# Patient Record
Sex: MALE | Race: WHITE | NOT HISPANIC OR LATINO | Employment: FULL TIME | URBAN - METROPOLITAN AREA
[De-identification: names, ages, dates, MRNs, and addresses within clinical notes are randomized per-mention and may not be internally consistent; named-entity substitution may affect disease eponyms.]

---

## 2017-01-17 ENCOUNTER — TELEPHONE (OUTPATIENT)
Dept: NEUROSURGERY | Facility: CLINIC | Age: 62
End: 2017-01-17

## 2017-01-17 ENCOUNTER — OFFICE VISIT (OUTPATIENT)
Dept: NEUROSURGERY | Facility: CLINIC | Age: 62
End: 2017-01-17

## 2017-01-17 VITALS
RESPIRATION RATE: 16 BRPM | DIASTOLIC BLOOD PRESSURE: 82 MMHG | WEIGHT: 303 LBS | HEART RATE: 100 BPM | SYSTOLIC BLOOD PRESSURE: 142 MMHG | BODY MASS INDEX: 36.88 KG/M2

## 2017-01-17 DIAGNOSIS — R20.0 LEFT ARM NUMBNESS: ICD-10-CM

## 2017-01-17 DIAGNOSIS — M47.812 CERVICAL SPONDYLOSIS WITHOUT MYELOPATHY: ICD-10-CM

## 2017-01-17 DIAGNOSIS — M54.12 CERVICAL RADICULOPATHY: Primary | ICD-10-CM

## 2017-01-17 DIAGNOSIS — M54.12 CERVICAL RADICULAR PAIN: ICD-10-CM

## 2017-01-17 PROCEDURE — 99214 OFFICE O/P EST MOD 30 MIN: CPT | Performed by: NEUROLOGICAL SURGERY

## 2017-01-17 RX ORDER — GABAPENTIN 100 MG/1
CAPSULE ORAL
Qty: 90 CAPSULE | Refills: 0 | Status: SHIPPED | OUTPATIENT
Start: 2017-01-17 | End: 2017-03-01 | Stop reason: SDUPTHER

## 2017-01-17 RX ORDER — BUPIVACAINE HCL/0.9 % NACL/PF 0.1 %
3 PLASTIC BAG, INJECTION (ML) EPIDURAL ONCE
Status: CANCELLED | OUTPATIENT
Start: 2017-01-17 | End: 2017-01-17

## 2017-01-17 RX ORDER — MELOXICAM 7.5 MG/1
TABLET ORAL
Qty: 60 TABLET | Refills: 1 | Status: SHIPPED | OUTPATIENT
Start: 2017-01-17 | End: 2017-03-01 | Stop reason: SDUPTHER

## 2017-01-17 NOTE — TELEPHONE ENCOUNTER
----- Message from Abril Cain sent at 1/17/2017 12:59 PM EST -----  Pt needs refill on meloxicam (needs 60 pills not 30 please as he takes two tablets once daily) and neurontin please. He forgot to ask for refills when he was here. Thanks.

## 2017-01-17 NOTE — MR AVS SNAPSHOT
Amador Mendoza   1/17/2017 11:30 AM   Office Visit    Dept Phone:  [phone]   Encounter #:  [id number]    Provider:  Lion Wood MD   Department:  [facility]                Your Full Care Plan              Your Updated Medication List          This list is accurate as of: 1/17/17  1:07 PM.  Always use your most recent med list.                ACETAMINOPHEN EXTRA STRENGTH PO       cetirizine 10 MG tablet   Commonly known as:  zyrTEC       FLONASE 50 MCG/ACT nasal spray   Generic drug:  fluticasone       gabapentin 100 MG capsule   Commonly known as:  NEURONTIN   Take 1 capsule by mouth 3 (three) times a day.       meloxicam 7.5 MG tablet   Commonly known as:  MOBIC   TAKE TWO TABLETS BY MOUTH DAILY               We Performed the Following     Case Request     Clorhexidine skin prep     Obtain informed consent       You Were Diagnosed With        Codes Comments    Cervical radiculopathy    -  Primary ICD-10-CM: M54.12  ICD-9-CM: 723.4     Cervical radicular pain     ICD-10-CM: M54.12  ICD-9-CM: 723.4     Left arm numbness     ICD-10-CM: R20.0  ICD-9-CM: 782.0     Cervical spondylosis without myelopathy     ICD-10-CM: M47.812  ICD-9-CM: 721.0       Instructions     None    Patient Instructions History      Upcoming Appointments     Visit Type Date Time Department    OFFICE VISIT 1/17/2017 11:30 AM [facility]    PAT 3/1/2017  7:30 AM  [facility]    OFFICE VISIT 3/1/2017  8:45 AM [facility]    POST-OP 3/15/2017  8:45 AM [facility]      [facility] Signup     Our records indicate that you have an active [employer] account.    You can view your After Visit Summary by going to [employer] and logging in with your [employer] username and password.  If you don't have a [employer] username and password but a parent or guardian has access to your record, the parent or guardian should login with their own [employer]  username and password and access your record to view the After Visit Summary.    If you have questions, you can email [email or web address] or call [phone] to talk to our [employer] staff.  Remember, [employer] is NOT to be used for urgent needs.  For medical emergencies, dial 911.               Other Info from Your Visit           Your Appointments     Mar 01, 2017  7:30 AM EST   Pre-Admission Testing with  [facility] BECKER 4   [facility] ([place])    [address] [facility] [address]   [phone]            Mar 01, 2017  8:45 AM EST   Office Visit with NICOLE Bob   On license of [facility] (--)    [address] [address] [phone]           Arrive 15 minutes prior to appointment.            Mar 15, 2017  8:45 AM EDT   Post-Op with NICOLE Bob   On license of [facility] (--)    [address] [place] [address] [phone]              Allergies     No Known Allergies      Reason for Visit     Numbness left arm    Extremity Weakness left  weakness      Vital Signs     Blood Pressure Pulse Respirations Weight Body Mass Index Smoking Status    142/82 (BP Location: Right arm, Patient Position: Sitting) 100 16 303 lb (137 kg) 36.88 kg/m2 Former Smoker      Problems and Diagnoses Noted     Cervical radicular pain    Degenerative arthritis of cervical spine    Left arm numbness    Cervical nerve root disorder    -  Primary

## 2017-01-17 NOTE — PROGRESS NOTES
Subjective   Patient ID: Amador Mendoza is a 62 y.o. male is here today for follow-up after cervical myelogram/CT for left arm numbness. He is unaccompanied.    History of Present Illness     The patient returns to the office today following cervical myelography and CT scanning.  His primary complaint is really neck discomfort that radiates into the left side of the shoulder in the supraspinatus area.  Continues with some numbness in the left lower extremity.  He continues with numbness in the left deltoid and biceps as well as the entirety of the left hand.    The following portions of the patient's history were reviewed and updated as appropriate: allergies, current medications, past family history, past medical history, past social history, past surgical history and problem list.    Review of Systems   Musculoskeletal: Negative for gait problem, neck pain and neck stiffness.   Neurological: Positive for weakness and numbness.       Objective   Physical Exam   Constitutional: He is oriented to person, place, and time.   Eyes: EOM are normal. Pupils are equal, round, and reactive to light.   Neurological: He is oriented to person, place, and time. He has a normal Finger-Nose-Finger Test, a normal Heel to Shin Test, a normal Romberg Test and a normal Tandem Gait Test. Gait normal.   Reflex Scores:       Tricep reflexes are 2+ on the right side and 2+ on the left side.       Bicep reflexes are 2+ on the right side and 2+ on the left side.       Brachioradialis reflexes are 2+ on the right side and 2+ on the left side.       Patellar reflexes are 2+ on the right side and 2+ on the left side.       Achilles reflexes are 2+ on the right side and 2+ on the left side.  Psychiatric: His speech is normal.     Neurologic Exam     Mental Status   Oriented to person, place, and time.   Registration: recalls 3 of 3 objects. Recall at 5 minutes: recalls 3 of 3 objects. Follows 3 step commands.   Attention: normal.  Concentration: normal.   Speech: speech is normal   Level of consciousness: alert  Knowledge: good.   Able to name object. Able to read. Able to repeat. Able to write. Normal comprehension.     Cranial Nerves     CN II   Visual fields full to confrontation.     CN III, IV, VI   Pupils are equal, round, and reactive to light.  Extraocular motions are normal.   Right pupil: Consensual response: intact.   Left pupil: Consensual response: intact.   CN III: no CN III palsy  CN VI: no CN VI palsy  Nystagmus: none   Diplopia: none  Ophthalmoparesis: none  Upgaze: normal  Downgaze: normal  Conjugate gaze: present  Vestibulo-ocular reflex: present    CN V   Facial sensation intact.     CN VII   Facial expression full, symmetric.     CN VIII   CN VIII normal.     CN IX, X   CN IX normal.     CN XI   CN XI normal.     CN XII   CN XII normal.     Motor Exam   Muscle bulk: normal  Overall muscle tone: normal  Right arm tone: normal  Left arm tone: normal  Right arm pronator drift: absent  Left arm pronator drift: absent  Right leg tone: normal  Left leg tone: normal    Strength   Right neck flexion: 5/5  Left neck flexion: 5/5  Right neck extension: 5/5  Left neck extension: 5/5  Right deltoid: 5/5  Left deltoid: 5/5  Right biceps: 5/5  Left biceps: 5/5  Right triceps: 5/5  Left triceps: 5/5  Right wrist flexion: 5/5  Left wrist flexion: 5/5  Right wrist extension: 5/5  Left wrist extension: 5/5  Right interossei: 5/5  Left interossei: 5/5  Right abdominals: 5/5  Left abdominals: 5/5  Right iliopsoas: 5/5  Left iliopsoas: 5/5  Right quadriceps: 5/5  Left quadriceps: 5/5  Right hamstrin/5  Left hamstrin/5  Right glutei: 5/5  Left glutei: 5/5  Right anterior tibial: 5/5  Left anterior tibial: 5/5  Right posterior tibial: 5/5  Left posterior tibial: 5/5  Right peroneal: 5/5  Left peroneal: 5/5  Right gastroc: 5/5  Left gastroc: 5/5    Sensory Exam   Light touch normal.   Vibration normal.   Proprioception normal.   Pinprick  normal.     Gait, Coordination, and Reflexes     Gait  Gait: normal    Coordination   Romberg: negative  Finger to nose coordination: normal  Heel to shin coordination: normal  Tandem walking coordination: normal    Tremor   Resting tremor: absent  Intention tremor: absent  Action tremor: absent    Reflexes   Right brachioradialis: 2+  Left brachioradialis: 2+  Right biceps: 2+  Left biceps: 2+  Right triceps: 2+  Left triceps: 2+  Right patellar: 2+  Left patellar: 2+  Right achilles: 2+  Left achilles: 2+  Right : 2+  Left : 2+  Right plantar: normal  Left plantar: normal  Right Acuna: absent  Left Acuna: absent  Right ankle clonus: absent  Left ankle clonus: absent      Assessment/Plan   Independent Review of Radiographic Studies:    I personally reviewed the MRI of the cervical spine as well as the CT/myelogram of the cervical spine.  These demonstrate severe neural foraminal narrowing on the left at C4 5.  There are other areas of mild degenerative arthritic change without significant neural foraminal narrowing except there is moderate degenerative neural foraminal narrowing on the left and on the right at C5 6.     NCV/EMG of the upper extremities demonstrated a moderate ulnar neuropathy on the left.  The lower extremity EMG and nerve conduction testing was essentially normal.      Medical Decision Making:    Presents with the above-noted history and physical findings.  There are no red flags.     Given options include continued conservative treatment or consideration of surgical intervention to alleviate the compression of the exiting C5 and C6 nerve roots.  He has numbness into the C5 and C6 distribution.  Consideration also, if he fails to improve with this surgical intervention of ulnar nerve decompression would be appropriate.  The fact is that he has numbness into the entirety of the hand which would not be explained by ulnar nerve entrapment alone.    The risks, benefits, and alternatives of  anterior cervical discectomy with allograft fusion and anterior instrumentation were explained in detail to the patient. The alternative is not to have the operation. The benefit should be, though is not guaranteed, primarily a potential reduction in the neurosensory and/or motor dysfunction; secondarily a possible reduction in neck pain. The risks include, but are not limited to, the possibility of death, infection, bleeding, paralysis, problems with the approach to the cervical spine such as stretching or cutting the laryngeal nerve resulting in vocal chord paralysis, hoarseness, blindness; damage to the carotid artery resulting in stroke; damage to the esophagus resulting in problems with swallowing or painful swallowing; incontinence of urine or stool, sexual dysfunction; meningitis; lack of bony fusion requiring further surgical intervention; osteomyelitis; instrumentation breakdown or pullout; spinal instability; no change or worsening of pain. I also explained the realistic expectations as they pertain to the procedure. The patient voiced understanding of these risks, benefits, alternatives, and realistic expectations and requests that we proceed with operative intervention.    After a complete physical exam, the patient has been informed of the consequences, benefits, appropriate us, and office policies regarding the medication being prescribed. A RHONDA check will be made on-line, and will be repeated if prescription is renewed after a 90 day period. The patient agrees to adhering to the medication regimen as prescribed.    The patient has been advised that we will manage post-operative pain for 1 month. If further narcotic medication is needed beyond that period, a referral back to the primary care physician or to a pain management specialist will be made. If the patient cancels or fails to show for scheduled follow-up visits or the pain management referral,  further narcotic prescriptions from this  practice may cease.      PLAN: C4 5 and C5 6 anterior cervical discectomy.    August was seen today for numbness and extremity weakness.    Diagnoses and all orders for this visit:    Cervical radiculopathy  -     Case Request; Standing  -     CBC and Differential; Future  -     Basic metabolic panel; Future  -     Sedimentation rate; Future  -     ceFAZolin (ANCEF) 3 g in sodium chloride 0.9 % 100 mL IVPB; Infuse 3 g into a venous catheter 1 (One) Time.  -     Case Request    Cervical radicular pain    Left arm numbness    Cervical spondylosis without myelopathy    Other orders  -     Obtain informed consent  -     ARCELIA hose- To be placed on patient in pre-op; Standing  -     SCD (sequential compression device)- to be placed on patient in Pre-op; Standing  -     POC Glucose Fingerstick; Standing  -     Inpatient Admission; Standing  -     Follow Anesthesia Guidelines / Standing Orders; Future  -     Clorhexidine skin prep  -     Follow Anesthesia Guidelines / Standing Orders; Standing  -     Verify informed consent; Standing  -     Verify NPO Status; Standing    Return for Recheck 2 weeks after surgery, Follow up with nurse practitioner.

## 2017-01-17 NOTE — TELEPHONE ENCOUNTER
I LVM with patient. As noted to him in the past, he should ask his pharmacy to submit the refill request for any meds other than pain medications. I will let Minnie/Sully know to change the quantity for the meloxicam. According to chart, there should still be a refill left for the neurontin.

## 2017-01-17 NOTE — LETTER
January 17, 2017     Javier Trujillo MD  [address] [facility] [address]  [place] [id number]    Patient: Amador Mendoza   YOB: 1955   Date of Visit: 1/17/2017       Dear Dr. Cassandra MD:    Amador Mendoza was in my office today. Below is a copy of my note.    If you have questions, please do not hesitate to call me. I look forward to following August along with you.         Sincerely,        Lion Wood MD        CC: Mk Rodríguez MD    Subjective   Patient ID: Amador Mendoza is a 62 y.o. male is here today for follow-up after cervical myelogram/CT for left arm numbness. He is unaccompanied.    History of Present Illness     The patient returns to the office today following cervical myelography and CT scanning.  His primary complaint is really neck discomfort that radiates into the left side of the shoulder in the supraspinatus area.  Continues with some numbness in the left lower extremity.  He continues with numbness in the left deltoid and biceps as well as the entirety of the left hand.    The following portions of the patient's history were reviewed and updated as appropriate: allergies, current medications, past family history, past medical history, past social history, past surgical history and problem list.    Review of Systems   Musculoskeletal: Negative for gait problem, neck pain and neck stiffness.   Neurological: Positive for weakness and numbness.       Objective   Physical Exam   Constitutional: He is oriented to person, place, and time.   Eyes: EOM are normal. Pupils are equal, round, and reactive to light.   Neurological: He is oriented to person, place, and time. He has a normal Finger-Nose-Finger Test, a normal Heel to Shin Test, a normal Romberg Test and a normal Tandem Gait Test. Gait normal.   Reflex Scores:       Tricep reflexes are 2+ on the right side and 2+ on the left side.       Bicep reflexes are 2+ on the right side and 2+ on the left side.   Brachioradialis reflexes are 2+ on the right side and 2+ on the left side.       Patellar reflexes are 2+ on the right side and 2+ on the left side.       Achilles reflexes are 2+ on the right side and 2+ on the left side.  Psychiatric: His speech is normal.     Neurologic Exam     Mental Status   Oriented to person, place, and time.   Registration: recalls 3 of 3 objects. Recall at 5 minutes: recalls 3 of 3 objects. Follows 3 step commands.   Attention: normal. Concentration: normal.   Speech: speech is normal   Level of consciousness: alert  Knowledge: good.   Able to name object. Able to read. Able to repeat. Able to write. Normal comprehension.     Cranial Nerves     CN II   Visual fields full to confrontation.     CN III, IV, VI   Pupils are equal, round, and reactive to light.  Extraocular motions are normal.   Right pupil: Consensual response: intact.   Left pupil: Consensual response: intact.   CN III: no CN III palsy  CN VI: no CN VI palsy  Nystagmus: none   Diplopia: none  Ophthalmoparesis: none  Upgaze: normal  Downgaze: normal  Conjugate gaze: present  Vestibulo-ocular reflex: present    CN V   Facial sensation intact.     CN VII   Facial expression full, symmetric.     CN VIII   CN VIII normal.     CN IX, X   CN IX normal.     CN XI   CN XI normal.     CN XII   CN XII normal.     Motor Exam   Muscle bulk: normal  Overall muscle tone: normal  Right arm tone: normal  Left arm tone: normal  Right arm pronator drift: absent  Left arm pronator drift: absent  Right leg tone: normal  Left leg tone: normal    Strength   Right neck flexion: 5/5  Left neck flexion: 5/5  Right neck extension: 5/5  Left neck extension: 5/5  Right deltoid: 5/5  Left deltoid: 5/5  Right biceps: 5/5  Left biceps: 5/5  Right triceps: 5/5  Left triceps: 5/5  Right wrist flexion: 5/5  Left wrist flexion: 5/5  Right wrist extension: 5/5  Left wrist extension: 5/5  Right interossei: 5/5  Left interossei: 5/5  Right abdominals: 5/5  Left  abdominals: 5/5  Right iliopsoas: 5/5  Left iliopsoas: 5/5  Right quadriceps: 5/5  Left quadriceps: 5/5  Right hamstrin/5  Left hamstrin/5  Right glutei: 5/5  Left glutei: 5/5  Right anterior tibial: 5/5  Left anterior tibial: 5/5  Right posterior tibial: 5/5  Left posterior tibial: 5/5  Right peroneal: 5/5  Left peroneal: 5/5  Right gastroc: 5/5  Left gastroc: 5/5    Sensory Exam   Light touch normal.   Vibration normal.   Proprioception normal.   Pinprick normal.     Gait, Coordination, and Reflexes     Gait  Gait: normal    Coordination   Romberg: negative  Finger to nose coordination: normal  Heel to shin coordination: normal  Tandem walking coordination: normal    Tremor   Resting tremor: absent  Intention tremor: absent  Action tremor: absent    Reflexes   Right brachioradialis: 2+  Left brachioradialis: 2+  Right biceps: 2+  Left biceps: 2+  Right triceps: 2+  Left triceps: 2+  Right patellar: 2+  Left patellar: 2+  Right achilles: 2+  Left achilles: 2+  Right : 2+  Left : 2+  Right plantar: normal  Left plantar: normal  Right Acuna: absent  Left Acuna: absent  Right ankle clonus: absent  Left ankle clonus: absent      Assessment/Plan   Independent Review of Radiographic Studies:    I personally reviewed the MRI of the cervical spine as well as the CT/myelogram of the cervical spine.  These demonstrate severe neural foraminal narrowing on the left at C4 5.  There are other areas of mild degenerative arthritic change without significant neural foraminal narrowing except there is moderate degenerative neural foraminal narrowing on the left and on the right at C5 6.     NCV/EMG of the upper extremities demonstrated a moderate ulnar neuropathy on the left.  The lower extremity EMG and nerve conduction testing was essentially normal.      Medical Decision Making:    Presents with the above-noted history and physical findings.  There are no red flags.     Given options include continued  conservative treatment or consideration of surgical intervention to alleviate the compression of the exiting C5 and C6 nerve roots.  He has numbness into the C5 and C6 distribution.  Consideration also, if he fails to improve with this surgical intervention of ulnar nerve decompression would be appropriate.  The fact is that he has numbness into the entirety of the hand which would not be explained by ulnar nerve entrapment alone.    The risks, benefits, and alternatives of anterior cervical discectomy with allograft fusion and anterior instrumentation were explained in detail to the patient. The alternative is not to have the operation. The benefit should be, though is not guaranteed, primarily a potential reduction in the neurosensory and/or motor dysfunction; secondarily a possible reduction in neck pain. The risks include, but are not limited to, the possibility of death, infection, bleeding, paralysis, problems with the approach to the cervical spine such as stretching or cutting the laryngeal nerve resulting in vocal chord paralysis, hoarseness, blindness; damage to the carotid artery resulting in stroke; damage to the esophagus resulting in problems with swallowing or painful swallowing; incontinence of urine or stool, sexual dysfunction; meningitis; lack of bony fusion requiring further surgical intervention; osteomyelitis; instrumentation breakdown or pullout; spinal instability; no change or worsening of pain. I also explained the realistic expectations as they pertain to the procedure. The patient voiced understanding of these risks, benefits, alternatives, and realistic expectations and requests that we proceed with operative intervention.    After a complete physical exam, the patient has been informed of the consequences, benefits, appropriate us, and office policies regarding the medication being prescribed. A RHONDA check will be made on-line, and will be repeated if prescription is renewed after a  90 day period. The patient agrees to adhering to the medication regimen as prescribed.    The patient has been advised that we will manage post-operative pain for 1 month. If further narcotic medication is needed beyond that period, a referral back to the primary care physician or to a pain management specialist will be made. If the patient cancels or fails to show for scheduled follow-up visits or the pain management referral,  further narcotic prescriptions from this practice may cease.      PLAN: C4 5 and C5 6 anterior cervical discectomy.    August was seen today for numbness and extremity weakness.    Diagnoses and all orders for this visit:    Cervical radiculopathy  -     Case Request; Standing  -     CBC and Differential; Future  -     Basic metabolic panel; Future  -     Sedimentation rate; Future  -     ceFAZolin (ANCEF) 3 g in sodium chloride 0.9 % 100 mL IVPB; Infuse 3 g into a venous catheter 1 (One) Time.  -     Case Request    Cervical radicular pain    Left arm numbness    Cervical spondylosis without myelopathy    Other orders  -     Obtain informed consent  -     ARCELIA hose- To be placed on patient in pre-op; Standing  -     SCD (sequential compression device)- to be placed on patient in Pre-op; Standing  -     POC Glucose Fingerstick; Standing  -     Inpatient Admission; Standing  -     Follow Anesthesia Guidelines / Standing Orders; Future  -     Clorhexidine skin prep  -     Follow Anesthesia Guidelines / Standing Orders; Standing  -     Verify informed consent; Standing  -     Verify NPO Status; Standing    Return for Recheck 2 weeks after surgery, Follow up with nurse practitioner.

## 2017-01-22 ENCOUNTER — RESULTS ENCOUNTER (OUTPATIENT)
Dept: NEUROSURGERY | Facility: CLINIC | Age: 62
End: 2017-01-22

## 2017-01-22 DIAGNOSIS — M54.12 CERVICAL RADICULOPATHY: ICD-10-CM

## 2017-02-27 ENCOUNTER — DOCUMENTATION (OUTPATIENT)
Dept: PHYSICAL THERAPY | Facility: CLINIC | Age: 62
End: 2017-02-27

## 2017-02-27 NOTE — PROGRESS NOTES
Discharge Summary  Discharge Summary from Physical Therapy Report    Patient: Amador Mendoza   : 1955  Diagnosis/ICD-10 Code:  Cervical Spine  Referring practitioner: Javier Trujillo MD  Date of Initial Visit: 16  Date of Last Visit: 16     Number of Visits: 9     Discharge Status of Patient: Unknown at this time.    Goals: Partially Met    Discharge Plan: Discharge for noncompliance with attendance.    Comments:  Pt called sometime after his last visit on 16 and reports that he would wait on further PT until after the results of his MRI.  The pt has not rescheduled at this time and will be discharged.    Date of Discharge: 2017        Raymond Rodriguez PT  Physical Therapist

## 2017-03-01 ENCOUNTER — APPOINTMENT (OUTPATIENT)
Dept: PREADMISSION TESTING | Facility: HOSPITAL | Age: 62
End: 2017-03-01

## 2017-03-01 ENCOUNTER — OFFICE VISIT (OUTPATIENT)
Dept: NEUROSURGERY | Facility: CLINIC | Age: 62
End: 2017-03-01

## 2017-03-01 VITALS
RESPIRATION RATE: 20 BRPM | BODY MASS INDEX: 38.09 KG/M2 | DIASTOLIC BLOOD PRESSURE: 91 MMHG | SYSTOLIC BLOOD PRESSURE: 157 MMHG | OXYGEN SATURATION: 94 % | TEMPERATURE: 97.8 F | WEIGHT: 306.3 LBS | HEART RATE: 79 BPM | HEIGHT: 75 IN

## 2017-03-01 VITALS
WEIGHT: 304 LBS | RESPIRATION RATE: 16 BRPM | DIASTOLIC BLOOD PRESSURE: 100 MMHG | SYSTOLIC BLOOD PRESSURE: 170 MMHG | HEART RATE: 84 BPM | BODY MASS INDEX: 38 KG/M2

## 2017-03-01 DIAGNOSIS — M54.12 CERVICAL RADICULAR PAIN: ICD-10-CM

## 2017-03-01 DIAGNOSIS — M47.812 CERVICAL SPONDYLOSIS WITHOUT MYELOPATHY: Primary | ICD-10-CM

## 2017-03-01 LAB
ANION GAP SERPL CALCULATED.3IONS-SCNC: 13.7 MMOL/L
BASOPHILS # BLD AUTO: 0.03 10*3/MM3 (ref 0–0.2)
BASOPHILS NFR BLD AUTO: 0.5 % (ref 0–1.5)
BUN BLD-MCNC: 14 MG/DL (ref 8–23)
BUN/CREAT SERPL: 14.7 (ref 7–25)
CALCIUM SPEC-SCNC: 9.4 MG/DL (ref 8.6–10.5)
CHLORIDE SERPL-SCNC: 101 MMOL/L (ref 98–107)
CO2 SERPL-SCNC: 23.3 MMOL/L (ref 22–29)
CREAT BLD-MCNC: 0.95 MG/DL (ref 0.76–1.27)
DEPRECATED RDW RBC AUTO: 44.3 FL (ref 37–54)
EOSINOPHIL # BLD AUTO: 0.13 10*3/MM3 (ref 0–0.7)
EOSINOPHIL NFR BLD AUTO: 2 % (ref 0.3–6.2)
ERYTHROCYTE [DISTWIDTH] IN BLOOD BY AUTOMATED COUNT: 12.8 % (ref 11.5–14.5)
ERYTHROCYTE [SEDIMENTATION RATE] IN BLOOD: 14 MM/HR (ref 0–20)
GFR SERPL CREATININE-BSD FRML MDRD: 81 ML/MIN/1.73
GLUCOSE BLD-MCNC: 138 MG/DL (ref 65–99)
HCT VFR BLD AUTO: 42.5 % (ref 40.4–52.2)
HGB BLD-MCNC: 14.3 G/DL (ref 13.7–17.6)
IMM GRANULOCYTES # BLD: 0.05 10*3/MM3 (ref 0–0.03)
IMM GRANULOCYTES NFR BLD: 0.8 % (ref 0–0.5)
LYMPHOCYTES # BLD AUTO: 2.38 10*3/MM3 (ref 0.9–4.8)
LYMPHOCYTES NFR BLD AUTO: 36.3 % (ref 19.6–45.3)
MCH RBC QN AUTO: 32 PG (ref 27–32.7)
MCHC RBC AUTO-ENTMCNC: 33.6 G/DL (ref 32.6–36.4)
MCV RBC AUTO: 95.1 FL (ref 79.8–96.2)
MONOCYTES # BLD AUTO: 0.48 10*3/MM3 (ref 0.2–1.2)
MONOCYTES NFR BLD AUTO: 7.3 % (ref 5–12)
NEUTROPHILS # BLD AUTO: 3.48 10*3/MM3 (ref 1.9–8.1)
NEUTROPHILS NFR BLD AUTO: 53.1 % (ref 42.7–76)
PLATELET # BLD AUTO: 234 10*3/MM3 (ref 140–500)
PMV BLD AUTO: 10.3 FL (ref 6–12)
POTASSIUM BLD-SCNC: 4.1 MMOL/L (ref 3.5–5.2)
RBC # BLD AUTO: 4.47 10*6/MM3 (ref 4.6–6)
SODIUM BLD-SCNC: 138 MMOL/L (ref 136–145)
WBC NRBC COR # BLD: 6.55 10*3/MM3 (ref 4.5–10.7)

## 2017-03-01 PROCEDURE — 80048 BASIC METABOLIC PNL TOTAL CA: CPT | Performed by: NEUROLOGICAL SURGERY

## 2017-03-01 PROCEDURE — 93005 ELECTROCARDIOGRAM TRACING: CPT

## 2017-03-01 PROCEDURE — 85652 RBC SED RATE AUTOMATED: CPT | Performed by: NEUROLOGICAL SURGERY

## 2017-03-01 PROCEDURE — 36415 COLL VENOUS BLD VENIPUNCTURE: CPT | Performed by: NEUROLOGICAL SURGERY

## 2017-03-01 PROCEDURE — 99214 OFFICE O/P EST MOD 30 MIN: CPT | Performed by: NURSE PRACTITIONER

## 2017-03-01 PROCEDURE — 85025 COMPLETE CBC W/AUTO DIFF WBC: CPT | Performed by: NEUROLOGICAL SURGERY

## 2017-03-01 PROCEDURE — 93010 ELECTROCARDIOGRAM REPORT: CPT | Performed by: INTERNAL MEDICINE

## 2017-03-01 RX ORDER — MELOXICAM 7.5 MG/1
7.5 TABLET ORAL
COMMUNITY
End: 2017-03-14 | Stop reason: HOSPADM

## 2017-03-01 RX ORDER — GABAPENTIN 100 MG/1
100 CAPSULE ORAL 3 TIMES DAILY
COMMUNITY
End: 2017-06-01

## 2017-03-01 NOTE — DISCHARGE INSTRUCTIONS
Take the following medications the morning of surgery with a small sip of water:  GABAPENTIN    ARRIVAL TIME 0700 TO MAIN OR        General Instructions:  • Do not eat or drink after midnight: includes water, mints, or gum. You may brush your teeth.  • Do not smoke, chew tobacco, or drink alcohol.  • The Pre-Admission Testing nurse will instruct you to bring medications if unable to obtain an accurate list in Pre-Admission Testing.    • If applicable bring your C-PAP/ BI-PAP machine.  • Bring any papers given to you in the doctor’s office.  • Wear clean comfortable clothes and socks.  • Do not wear contact lenses or make-up.  Bring a case for your glasses if applicable.   • Bring crutches or walker if applicable.  • Leave all other valuables and jewelry at home.        Preventing a Surgical Site Infection:  Shower on the morning of surgery using a fresh bar of anti-bacterial soap (such as Dial) and clean washcloth.  Dry with a clean towel and dress in clean clothing.  For 2 to 3 days before surgery, avoid shaving with a razor near where you will have surgery because the razor can irritate skin and make it easier to develop an infection  Ask your surgeon if you will be receiving antibiotics prior to surgery  Make sure you, your family, and all healthcare providers clean their hands with soap and water or an alcohol based hand  before caring for you or your wound  If at all possible, quit smoking as many days before surgery as you can.    Day of surgery:  Upon arrival, a Pre-op nurse and Anesthesiologist will review your health history, obtain vital signs, and answer questions you may have.  The only belongings needed at this time will be your home medications and if applicable your C-PAP/BI-PAP machine.  If you are staying overnight your family can leave the rest of your belongings in the car and bring them to your room later.  A Pre-op nurse will start an IV and you may receive medication in preparation for  surgery, including something to help you relax.  Your family will be able to see you in the Pre-op area.  While you are in surgery your family should notify the waiting room  if they leave the waiting room area and provide a contact phone number.    Please be aware that surgery does come with discomfort.  We want to make every effort to control your discomfort so please discuss any uncontrolled symptoms with your nurse.   Your doctor will most likely have prescribed pain medications.      If you are going home after surgery you will receive individualized written care instructions before being discharged.  A responsible adult must drive you to and from the hospital on the day of your surgery and stay with you for 24 hours.    If you are staying overnight following surgery, you will be transported to your hospital room following the recovery period.  [facility] has all private rooms.    If you have any questions please call Pre-Admission Testing at [phone].  Deductibles and co-payments are collected on the day of service. Please be prepared to pay the required co-pay, deductible or deposit on the day of service as defined by your plan.

## 2017-03-01 NOTE — PROGRESS NOTES
Subjective   Patient ID: Amador Mendoza is a 62 y.o. male is here today for follow-up of neck pain and left arm numbness prior to scheduled ACDF on 3-6-17. He is unaccompanied.    History of Present Illness   Patient presents for followup prior to undergoing 2 level ACDF. He reports continued neck pain with right arm numbness. His right shoulder has become more painful. He denies any new weakness, dyscoordination, or imbalance.     The following portions of the patient's history were reviewed and updated as appropriate: allergies, current medications, past family history, past medical history, past social history, past surgical history and problem list.  .    Review of Systems   Constitutional: Negative for fever.   HENT: Negative for trouble swallowing.    Gastrointestinal: Negative for abdominal pain.   Genitourinary: Negative for difficulty urinating and enuresis.   Musculoskeletal: Positive for neck pain. Negative for gait problem.   Neurological: Positive for weakness (left ) and numbness.       Objective        Results from last 7 days  Lab Units 03/01/17  0735   WBC 10*3/mm3 6.55   HEMOGLOBIN g/dL 14.3   HEMATOCRIT % 42.5   PLATELETS 10*3/mm3 234       Results from last 7 days  Lab Units 03/01/17  0735   SODIUM mmol/L 138   POTASSIUM mmol/L 4.1   CHLORIDE mmol/L 101   TOTAL CO2 mmol/L 23.3   BUN mg/dL 14   CREATININE mg/dL 0.95   CALCIUM mg/dL 9.4   GLUCOSE mg/dL 138*         Results from last 7 days  Lab Units 03/01/17  0735   SED RATE mm/hr 14       EKG: normal SR with left anterior fasicular block- pending MD review per report    Physical Exam   Constitutional: He is oriented to person, place, and time. He appears well-developed and well-nourished. He is cooperative.   obese   Eyes: No scleral icterus.   Neck: Neck supple.   Cardiovascular: Normal rate, regular rhythm and intact distal pulses.    No murmur heard.  Pulmonary/Chest: Effort normal and breath sounds normal.   Abdominal: Soft. Bowel  sounds are normal. There is no tenderness.   Musculoskeletal:        Cervical back: He exhibits decreased range of motion (mildly with rotation) and pain (mildly with rotation).   Neurological: He is alert and oriented to person, place, and time. He has normal strength. He displays no atrophy. No cranial nerve deficit or sensory deficit. He exhibits normal muscle tone. He has a normal Romberg Test. He displays a negative Romberg sign. Gait normal. Coordination and gait normal. GCS eye subscore is 4. GCS verbal subscore is 5. GCS motor subscore is 6.   Reflex Scores:       Tricep reflexes are 2+ on the right side and 2+ on the left side.       Bicep reflexes are 2+ on the right side and 2+ on the left side.       Brachioradialis reflexes are 2+ on the right side and 2+ on the left side.       Patellar reflexes are 2+ on the right side and 2+ on the left side.       Achilles reflexes are 2+ on the right side and 2+ on the left side.  Negative Acuna and clonus  Finger to nose intact   Heel to shin intact  Able to tandem walk  Able to walk on heels and toes   Skin: Skin is warm, dry and intact.   Psychiatric: He has a normal mood and affect. His speech is normal and behavior is normal. Judgment and thought content normal. Cognition and memory are normal.   Vitals reviewed.    Neurologic Exam     Mental Status   Oriented to person, place, and time.   Speech: speech is normal   Level of consciousness: alert    Motor Exam   Muscle bulk: normal  Overall muscle tone: normal    Strength   Strength 5/5 throughout.     Sensory Exam   Right arm light touch: normal  Left arm light touch: normal    Gait, Coordination, and Reflexes     Gait  Gait: normal    Coordination   Romberg: negative    Reflexes   Right brachioradialis: 2+  Left brachioradialis: 2+  Right biceps: 2+  Left biceps: 2+  Right triceps: 2+  Left triceps: 2+  Right patellar: 2+  Left patellar: 2+  Right achilles: 2+  Left achilles: 2+  Right Acuna: absent  Left  Armand: absent      Assessment/Plan   Independent Review of Radiographic Studies:    No new images    Medical Decision Making:    Patient with ongoing neck and right shoulder pain with arm numbness. He completed PAT today. His BP is elevated in our office, but was 150s/80s in PAT. His exam is noted above with no red flags. He knows to stop his meloxicam at least one week preop. RBAs of surgery were discussed in detail with Dr. Wood at previous visit. I answered questions with regard to PO expectations and activity. Patient is ready to proceed. 12 lead EKG is pending MD review, but likely LAF block is not an issue.    PLAN: C4/5 and C5/6 ACDF and RTO following    August was seen today for neck pain and numbness.    Diagnoses and all orders for this visit:    Cervical spondylosis without myelopathy    Cervical radicular pain      Return for Next scheduled follow up.         Addendum: Patient's symptoms are LEFT sided not right sided.

## 2017-03-13 ENCOUNTER — HOSPITAL ENCOUNTER (INPATIENT)
Facility: HOSPITAL | Age: 62
LOS: 1 days | Discharge: HOME | End: 2017-03-14
Attending: NEUROLOGICAL SURGERY | Admitting: NEUROLOGICAL SURGERY

## 2017-03-13 ENCOUNTER — ANESTHESIA EVENT (OUTPATIENT)
Dept: PERIOP | Facility: HOSPITAL | Age: 62
End: 2017-03-13

## 2017-03-13 ENCOUNTER — ANESTHESIA (OUTPATIENT)
Dept: PERIOP | Facility: HOSPITAL | Age: 62
End: 2017-03-13

## 2017-03-13 ENCOUNTER — APPOINTMENT (OUTPATIENT)
Dept: GENERAL RADIOLOGY | Facility: HOSPITAL | Age: 62
End: 2017-03-13

## 2017-03-13 DIAGNOSIS — M54.12 CERVICAL RADICULOPATHY: ICD-10-CM

## 2017-03-13 PROCEDURE — 94799 UNLISTED PULMONARY SVC/PX: CPT

## 2017-03-13 PROCEDURE — 25010000002 FENTANYL CITRATE (PF) 100 MCG/2ML SOLUTION: Performed by: NURSE ANESTHETIST, CERTIFIED REGISTERED

## 2017-03-13 PROCEDURE — 25010000002 VANCOMYCIN PER 500 MG: Performed by: NEUROLOGICAL SURGERY

## 2017-03-13 PROCEDURE — 25010000002 PHENYLEPHRINE PER 1 ML: Performed by: NURSE ANESTHETIST, CERTIFIED REGISTERED

## 2017-03-13 PROCEDURE — 25010000002 HYDROMORPHONE PER 4 MG: Performed by: NURSE ANESTHETIST, CERTIFIED REGISTERED

## 2017-03-13 PROCEDURE — L0120 CERV FLEX N/ADJ FOAM PRE OTS: HCPCS | Performed by: NEUROLOGICAL SURGERY

## 2017-03-13 PROCEDURE — 25010000002 MORPHINE PER 10 MG: Performed by: NURSE PRACTITIONER

## 2017-03-13 PROCEDURE — 25810000003 SODIUM CHLORIDE 0.9 % WITH KCL 20 MEQ 20-0.9 MEQ/L-% SOLUTION: Performed by: NEUROLOGICAL SURGERY

## 2017-03-13 PROCEDURE — 25010000002 PROPOFOL 10 MG/ML EMULSION: Performed by: NURSE ANESTHETIST, CERTIFIED REGISTERED

## 2017-03-13 PROCEDURE — 25010000002 SUCCINYLCHOLINE PER 20 MG: Performed by: NURSE ANESTHETIST, CERTIFIED REGISTERED

## 2017-03-13 PROCEDURE — 0RB30ZZ EXCISION OF CERVICAL VERTEBRAL DISC, OPEN APPROACH: ICD-10-PCS | Performed by: NEUROLOGICAL SURGERY

## 2017-03-13 PROCEDURE — 00QT0ZZ REPAIR SPINAL MENINGES, OPEN APPROACH: ICD-10-PCS | Performed by: NEUROLOGICAL SURGERY

## 2017-03-13 PROCEDURE — 22551 ARTHRD ANT NTRBDY CERVICAL: CPT | Performed by: NEUROLOGICAL SURGERY

## 2017-03-13 PROCEDURE — 76000 FLUOROSCOPY <1 HR PHYS/QHP: CPT

## 2017-03-13 PROCEDURE — 22845 INSERT SPINE FIXATION DEVICE: CPT | Performed by: NEUROLOGICAL SURGERY

## 2017-03-13 PROCEDURE — C1713 ANCHOR/SCREW BN/BN,TIS/BN: HCPCS | Performed by: NEUROLOGICAL SURGERY

## 2017-03-13 PROCEDURE — 22853 INSJ BIOMECHANICAL DEVICE: CPT | Performed by: NEUROLOGICAL SURGERY

## 2017-03-13 PROCEDURE — 22552 ARTHRD ANT NTRBD CERVICAL EA: CPT | Performed by: NEUROLOGICAL SURGERY

## 2017-03-13 PROCEDURE — 72040 X-RAY EXAM NECK SPINE 2-3 VW: CPT

## 2017-03-13 PROCEDURE — 0RG20A0 FUSION OF 2 OR MORE CERVICAL VERTEBRAL JOINTS WITH INTERBODY FUSION DEVICE, ANTERIOR APPROACH, ANTERIOR COLUMN, OPEN APPROACH: ICD-10-PCS | Performed by: NEUROLOGICAL SURGERY

## 2017-03-13 PROCEDURE — 25010000002 MIDAZOLAM PER 1 MG: Performed by: ANESTHESIOLOGY

## 2017-03-13 PROCEDURE — 20936 SP BONE AGRFT LOCAL ADD-ON: CPT | Performed by: NEUROLOGICAL SURGERY

## 2017-03-13 PROCEDURE — 25010000002 ONDANSETRON PER 1 MG: Performed by: NEUROLOGICAL SURGERY

## 2017-03-13 PROCEDURE — 25010000002 DEXAMETHASONE PER 1 MG: Performed by: NURSE ANESTHETIST, CERTIFIED REGISTERED

## 2017-03-13 DEVICE — IMPLANT 6240764 ANATOMIC 16X14X7MM
Type: IMPLANTABLE DEVICE | Site: SPINE CERVICAL | Status: FUNCTIONAL
Brand: VERTE-STACK® SPINAL SYSTEM

## 2017-03-13 DEVICE — IMPLANT 6240864 ANATOMIC 16X14X8MM
Type: IMPLANTABLE DEVICE | Status: FUNCTIONAL
Brand: VERTE-STACK® SPINAL SYSTEM

## 2017-03-13 DEVICE — SCREW 3120515 4.0 X 15 SELF DRILL VAR
Type: IMPLANTABLE DEVICE | Site: SPINE CERVICAL | Status: FUNCTIONAL
Brand: ATLANTIS® ANTERIOR CERVICAL PLATE SYSTEM

## 2017-03-13 DEVICE — SEALANT WND FIBRIN TISSEEL VAPOR/HEAT/PREFIL/SYR 10ML: Type: IMPLANTABLE DEVICE | Status: FUNCTIONAL

## 2017-03-13 RX ORDER — PROMETHAZINE HYDROCHLORIDE 25 MG/ML
12.5 INJECTION, SOLUTION INTRAMUSCULAR; INTRAVENOUS ONCE AS NEEDED
Status: DISCONTINUED | OUTPATIENT
Start: 2017-03-13 | End: 2017-03-13 | Stop reason: HOSPADM

## 2017-03-13 RX ORDER — NALOXONE HCL 0.4 MG/ML
0.4 VIAL (ML) INJECTION
Status: DISCONTINUED | OUTPATIENT
Start: 2017-03-13 | End: 2017-03-13

## 2017-03-13 RX ORDER — HYDROCODONE BITARTRATE AND ACETAMINOPHEN 5; 325 MG/1; MG/1
2 TABLET ORAL EVERY 6 HOURS PRN
Status: DISCONTINUED | OUTPATIENT
Start: 2017-03-13 | End: 2017-03-14 | Stop reason: HOSPADM

## 2017-03-13 RX ORDER — MIDAZOLAM HYDROCHLORIDE 1 MG/ML
2 INJECTION INTRAMUSCULAR; INTRAVENOUS
Status: DISCONTINUED | OUTPATIENT
Start: 2017-03-13 | End: 2017-03-13 | Stop reason: HOSPADM

## 2017-03-13 RX ORDER — FLUMAZENIL 0.1 MG/ML
0.2 INJECTION INTRAVENOUS AS NEEDED
Status: DISCONTINUED | OUTPATIENT
Start: 2017-03-13 | End: 2017-03-13 | Stop reason: HOSPADM

## 2017-03-13 RX ORDER — DEXAMETHASONE SODIUM PHOSPHATE 10 MG/ML
INJECTION INTRAMUSCULAR; INTRAVENOUS AS NEEDED
Status: DISCONTINUED | OUTPATIENT
Start: 2017-03-13 | End: 2017-03-13 | Stop reason: SURG

## 2017-03-13 RX ORDER — LABETALOL HYDROCHLORIDE 5 MG/ML
5 INJECTION, SOLUTION INTRAVENOUS
Status: DISCONTINUED | OUTPATIENT
Start: 2017-03-13 | End: 2017-03-13 | Stop reason: HOSPADM

## 2017-03-13 RX ORDER — ONDANSETRON 4 MG/1
4 TABLET, ORALLY DISINTEGRATING ORAL EVERY 6 HOURS PRN
Status: DISCONTINUED | OUTPATIENT
Start: 2017-03-13 | End: 2017-03-14 | Stop reason: HOSPADM

## 2017-03-13 RX ORDER — FENTANYL CITRATE 50 UG/ML
50 INJECTION, SOLUTION INTRAMUSCULAR; INTRAVENOUS
Status: DISCONTINUED | OUTPATIENT
Start: 2017-03-13 | End: 2017-03-13 | Stop reason: HOSPADM

## 2017-03-13 RX ORDER — NALOXONE HCL 0.4 MG/ML
0.4 VIAL (ML) INJECTION
Status: DISCONTINUED | OUTPATIENT
Start: 2017-03-13 | End: 2017-03-14 | Stop reason: HOSPADM

## 2017-03-13 RX ORDER — SODIUM CHLORIDE 9 MG/ML
INJECTION, SOLUTION INTRAVENOUS AS NEEDED
Status: DISCONTINUED | OUTPATIENT
Start: 2017-03-13 | End: 2017-03-13 | Stop reason: HOSPADM

## 2017-03-13 RX ORDER — HYDROCODONE BITARTRATE AND ACETAMINOPHEN 5; 325 MG/1; MG/1
1 TABLET ORAL EVERY 4 HOURS PRN
Status: DISCONTINUED | OUTPATIENT
Start: 2017-03-13 | End: 2017-03-14 | Stop reason: HOSPADM

## 2017-03-13 RX ORDER — MIDAZOLAM HYDROCHLORIDE 1 MG/ML
1 INJECTION INTRAMUSCULAR; INTRAVENOUS
Status: DISCONTINUED | OUTPATIENT
Start: 2017-03-13 | End: 2017-03-13 | Stop reason: HOSPADM

## 2017-03-13 RX ORDER — ACETAMINOPHEN 325 MG/1
650 TABLET ORAL EVERY 4 HOURS PRN
Status: DISCONTINUED | OUTPATIENT
Start: 2017-03-13 | End: 2017-03-14 | Stop reason: HOSPADM

## 2017-03-13 RX ORDER — FAMOTIDINE 20 MG/1
20 TABLET, FILM COATED ORAL 2 TIMES DAILY
Status: DISCONTINUED | OUTPATIENT
Start: 2017-03-13 | End: 2017-03-14 | Stop reason: HOSPADM

## 2017-03-13 RX ORDER — SODIUM CHLORIDE AND POTASSIUM CHLORIDE 150; 900 MG/100ML; MG/100ML
60 INJECTION, SOLUTION INTRAVENOUS CONTINUOUS
Status: DISCONTINUED | OUTPATIENT
Start: 2017-03-13 | End: 2017-03-14 | Stop reason: HOSPADM

## 2017-03-13 RX ORDER — FAMOTIDINE 10 MG/ML
20 INJECTION, SOLUTION INTRAVENOUS ONCE
Status: COMPLETED | OUTPATIENT
Start: 2017-03-13 | End: 2017-03-13

## 2017-03-13 RX ORDER — FAMOTIDINE 10 MG/ML
20 INJECTION, SOLUTION INTRAVENOUS 2 TIMES DAILY
Status: DISCONTINUED | OUTPATIENT
Start: 2017-03-13 | End: 2017-03-14 | Stop reason: HOSPADM

## 2017-03-13 RX ORDER — SODIUM CHLORIDE, SODIUM LACTATE, POTASSIUM CHLORIDE, CALCIUM CHLORIDE 600; 310; 30; 20 MG/100ML; MG/100ML; MG/100ML; MG/100ML
9 INJECTION, SOLUTION INTRAVENOUS CONTINUOUS
Status: DISCONTINUED | OUTPATIENT
Start: 2017-03-13 | End: 2017-03-14 | Stop reason: HOSPADM

## 2017-03-13 RX ORDER — NALOXONE HCL 0.4 MG/ML
0.2 VIAL (ML) INJECTION AS NEEDED
Status: DISCONTINUED | OUTPATIENT
Start: 2017-03-13 | End: 2017-03-13 | Stop reason: HOSPADM

## 2017-03-13 RX ORDER — ONDANSETRON 2 MG/ML
4 INJECTION INTRAMUSCULAR; INTRAVENOUS ONCE AS NEEDED
Status: DISCONTINUED | OUTPATIENT
Start: 2017-03-13 | End: 2017-03-13 | Stop reason: HOSPADM

## 2017-03-13 RX ORDER — SODIUM CHLORIDE 0.9 % (FLUSH) 0.9 %
1-10 SYRINGE (ML) INJECTION AS NEEDED
Status: DISCONTINUED | OUTPATIENT
Start: 2017-03-13 | End: 2017-03-14 | Stop reason: HOSPADM

## 2017-03-13 RX ORDER — DOCUSATE SODIUM 100 MG/1
100 CAPSULE, LIQUID FILLED ORAL 2 TIMES DAILY PRN
Status: DISCONTINUED | OUTPATIENT
Start: 2017-03-13 | End: 2017-03-13

## 2017-03-13 RX ORDER — HYDROMORPHONE HYDROCHLORIDE 1 MG/ML
0.5 INJECTION, SOLUTION INTRAMUSCULAR; INTRAVENOUS; SUBCUTANEOUS
Status: DISCONTINUED | OUTPATIENT
Start: 2017-03-13 | End: 2017-03-13 | Stop reason: HOSPADM

## 2017-03-13 RX ORDER — MORPHINE SULFATE 2 MG/ML
1 INJECTION, SOLUTION INTRAMUSCULAR; INTRAVENOUS EVERY 4 HOURS PRN
Status: DISCONTINUED | OUTPATIENT
Start: 2017-03-13 | End: 2017-03-13

## 2017-03-13 RX ORDER — MORPHINE SULFATE 2 MG/ML
2 INJECTION, SOLUTION INTRAMUSCULAR; INTRAVENOUS EVERY 4 HOURS PRN
Status: DISCONTINUED | OUTPATIENT
Start: 2017-03-13 | End: 2017-03-14 | Stop reason: HOSPADM

## 2017-03-13 RX ORDER — ACETAMINOPHEN 500 MG
1000 TABLET ORAL ONCE
Status: COMPLETED | OUTPATIENT
Start: 2017-03-13 | End: 2017-03-13

## 2017-03-13 RX ORDER — GABAPENTIN 100 MG/1
100 CAPSULE ORAL 3 TIMES DAILY
Status: DISCONTINUED | OUTPATIENT
Start: 2017-03-13 | End: 2017-03-14 | Stop reason: HOSPADM

## 2017-03-13 RX ORDER — SUCCINYLCHOLINE CHLORIDE 20 MG/ML
INJECTION INTRAMUSCULAR; INTRAVENOUS AS NEEDED
Status: DISCONTINUED | OUTPATIENT
Start: 2017-03-13 | End: 2017-03-13 | Stop reason: SURG

## 2017-03-13 RX ORDER — PROMETHAZINE HYDROCHLORIDE 25 MG/1
25 SUPPOSITORY RECTAL ONCE AS NEEDED
Status: DISCONTINUED | OUTPATIENT
Start: 2017-03-13 | End: 2017-03-13 | Stop reason: HOSPADM

## 2017-03-13 RX ORDER — LIDOCAINE HYDROCHLORIDE 20 MG/ML
INJECTION, SOLUTION INFILTRATION; PERINEURAL AS NEEDED
Status: DISCONTINUED | OUTPATIENT
Start: 2017-03-13 | End: 2017-03-13 | Stop reason: SURG

## 2017-03-13 RX ORDER — SENNA AND DOCUSATE SODIUM 50; 8.6 MG/1; MG/1
2 TABLET, FILM COATED ORAL NIGHTLY
Status: DISCONTINUED | OUTPATIENT
Start: 2017-03-13 | End: 2017-03-14 | Stop reason: HOSPADM

## 2017-03-13 RX ORDER — PROMETHAZINE HYDROCHLORIDE 25 MG/1
25 TABLET ORAL ONCE AS NEEDED
Status: DISCONTINUED | OUTPATIENT
Start: 2017-03-13 | End: 2017-03-13 | Stop reason: HOSPADM

## 2017-03-13 RX ORDER — BUPIVACAINE HCL/0.9 % NACL/PF 0.1 %
3 PLASTIC BAG, INJECTION (ML) EPIDURAL ONCE
Status: COMPLETED | OUTPATIENT
Start: 2017-03-13 | End: 2017-03-13

## 2017-03-13 RX ORDER — FENTANYL CITRATE 50 UG/ML
INJECTION, SOLUTION INTRAMUSCULAR; INTRAVENOUS AS NEEDED
Status: DISCONTINUED | OUTPATIENT
Start: 2017-03-13 | End: 2017-03-13 | Stop reason: SURG

## 2017-03-13 RX ORDER — HYDRALAZINE HYDROCHLORIDE 20 MG/ML
5 INJECTION INTRAMUSCULAR; INTRAVENOUS
Status: DISCONTINUED | OUTPATIENT
Start: 2017-03-13 | End: 2017-03-13 | Stop reason: HOSPADM

## 2017-03-13 RX ORDER — HYDROMORPHONE HCL 110MG/55ML
PATIENT CONTROLLED ANALGESIA SYRINGE INTRAVENOUS AS NEEDED
Status: DISCONTINUED | OUTPATIENT
Start: 2017-03-13 | End: 2017-03-13 | Stop reason: SURG

## 2017-03-13 RX ORDER — CETIRIZINE HYDROCHLORIDE 10 MG/1
10 TABLET ORAL DAILY
Status: DISCONTINUED | OUTPATIENT
Start: 2017-03-13 | End: 2017-03-14 | Stop reason: HOSPADM

## 2017-03-13 RX ORDER — MAGNESIUM HYDROXIDE 1200 MG/15ML
LIQUID ORAL AS NEEDED
Status: DISCONTINUED | OUTPATIENT
Start: 2017-03-13 | End: 2017-03-13 | Stop reason: HOSPADM

## 2017-03-13 RX ORDER — DIPHENHYDRAMINE HYDROCHLORIDE 50 MG/ML
12.5 INJECTION INTRAMUSCULAR; INTRAVENOUS
Status: DISCONTINUED | OUTPATIENT
Start: 2017-03-13 | End: 2017-03-13 | Stop reason: HOSPADM

## 2017-03-13 RX ORDER — ONDANSETRON 2 MG/ML
4 INJECTION INTRAMUSCULAR; INTRAVENOUS EVERY 6 HOURS PRN
Status: DISCONTINUED | OUTPATIENT
Start: 2017-03-13 | End: 2017-03-14 | Stop reason: HOSPADM

## 2017-03-13 RX ORDER — DOCUSATE SODIUM 100 MG/1
100 CAPSULE, LIQUID FILLED ORAL DAILY
Status: DISCONTINUED | OUTPATIENT
Start: 2017-03-14 | End: 2017-03-14 | Stop reason: HOSPADM

## 2017-03-13 RX ORDER — PROPOFOL 10 MG/ML
VIAL (ML) INTRAVENOUS AS NEEDED
Status: DISCONTINUED | OUTPATIENT
Start: 2017-03-13 | End: 2017-03-13 | Stop reason: SURG

## 2017-03-13 RX ORDER — SODIUM CHLORIDE 0.9 % (FLUSH) 0.9 %
1-10 SYRINGE (ML) INJECTION AS NEEDED
Status: DISCONTINUED | OUTPATIENT
Start: 2017-03-13 | End: 2017-03-13 | Stop reason: HOSPADM

## 2017-03-13 RX ORDER — ONDANSETRON 4 MG/1
4 TABLET, FILM COATED ORAL EVERY 6 HOURS PRN
Status: DISCONTINUED | OUTPATIENT
Start: 2017-03-13 | End: 2017-03-14 | Stop reason: HOSPADM

## 2017-03-13 RX ORDER — FLUTICASONE PROPIONATE 50 MCG
1 SPRAY, SUSPENSION (ML) NASAL DAILY
Status: DISCONTINUED | OUTPATIENT
Start: 2017-03-13 | End: 2017-03-14 | Stop reason: HOSPADM

## 2017-03-13 RX ADMIN — HYDROCODONE BITARTRATE AND ACETAMINOPHEN 1 TABLET: 5; 325 TABLET ORAL at 22:06

## 2017-03-13 RX ADMIN — ONDANSETRON 4 MG: 2 INJECTION INTRAMUSCULAR; INTRAVENOUS at 22:06

## 2017-03-13 RX ADMIN — PHENYLEPHRINE HYDROCHLORIDE 200 MCG: 10 INJECTION INTRAVENOUS at 13:13

## 2017-03-13 RX ADMIN — EPHEDRINE SULFATE 10 MG: 50 INJECTION INTRAMUSCULAR; INTRAVENOUS; SUBCUTANEOUS at 10:45

## 2017-03-13 RX ADMIN — PROPOFOL 200 MG: 10 INJECTION, EMULSION INTRAVENOUS at 10:16

## 2017-03-13 RX ADMIN — DOCUSATE SODIUM,SENNOSIDES 2 TABLET: 50; 8.6 TABLET, FILM COATED ORAL at 21:35

## 2017-03-13 RX ADMIN — FENTANYL CITRATE 50 MCG: 50 INJECTION INTRAMUSCULAR; INTRAVENOUS at 14:09

## 2017-03-13 RX ADMIN — SUCCINYLCHOLINE CHLORIDE 180 MG: 20 INJECTION, SOLUTION INTRAMUSCULAR; INTRAVENOUS; PARENTERAL at 10:16

## 2017-03-13 RX ADMIN — HYDROMORPHONE HYDROCHLORIDE 1 MG: 2 INJECTION, SOLUTION INTRAMUSCULAR; INTRAVENOUS; SUBCUTANEOUS at 13:37

## 2017-03-13 RX ADMIN — FAMOTIDINE 20 MG: 10 INJECTION, SOLUTION INTRAVENOUS at 07:33

## 2017-03-13 RX ADMIN — GABAPENTIN 100 MG: 100 CAPSULE ORAL at 21:35

## 2017-03-13 RX ADMIN — CETIRIZINE HYDROCHLORIDE 10 MG: 10 TABLET, FILM COATED ORAL at 21:35

## 2017-03-13 RX ADMIN — PHENYLEPHRINE HYDROCHLORIDE 200 MCG: 10 INJECTION INTRAVENOUS at 11:05

## 2017-03-13 RX ADMIN — POTASSIUM CHLORIDE AND SODIUM CHLORIDE 60 ML/HR: 900; 150 INJECTION, SOLUTION INTRAVENOUS at 17:03

## 2017-03-13 RX ADMIN — CEFAZOLIN 3 G: 1 INJECTION, POWDER, FOR SOLUTION INTRAMUSCULAR; INTRAVENOUS; PARENTERAL at 10:15

## 2017-03-13 RX ADMIN — LIDOCAINE HYDROCHLORIDE 60 MG: 20 INJECTION, SOLUTION INFILTRATION; PERINEURAL at 10:16

## 2017-03-13 RX ADMIN — FAMOTIDINE 20 MG: 20 TABLET, FILM COATED ORAL at 17:03

## 2017-03-13 RX ADMIN — FENTANYL CITRATE 100 MCG: 50 INJECTION, SOLUTION INTRAMUSCULAR; INTRAVENOUS at 12:53

## 2017-03-13 RX ADMIN — FENTANYL CITRATE 50 MCG: 50 INJECTION, SOLUTION INTRAMUSCULAR; INTRAVENOUS at 13:32

## 2017-03-13 RX ADMIN — DEXAMETHASONE SODIUM PHOSPHATE 8 MG: 10 INJECTION INTRAMUSCULAR; INTRAVENOUS at 10:32

## 2017-03-13 RX ADMIN — MIDAZOLAM HYDROCHLORIDE 1 MG: 1 INJECTION, SOLUTION INTRAMUSCULAR; INTRAVENOUS at 07:33

## 2017-03-13 RX ADMIN — PHENYLEPHRINE HYDROCHLORIDE 200 MCG: 10 INJECTION INTRAVENOUS at 10:54

## 2017-03-13 RX ADMIN — EPHEDRINE SULFATE 10 MG: 50 INJECTION INTRAMUSCULAR; INTRAVENOUS; SUBCUTANEOUS at 10:32

## 2017-03-13 RX ADMIN — REMIFENTANIL HYDROCHLORIDE 0.25 MCG/KG/MIN: 1 INJECTION, POWDER, LYOPHILIZED, FOR SOLUTION INTRAVENOUS at 10:24

## 2017-03-13 RX ADMIN — FENTANYL CITRATE 100 MCG: 50 INJECTION, SOLUTION INTRAMUSCULAR; INTRAVENOUS at 10:16

## 2017-03-13 RX ADMIN — ACETAMINOPHEN 1000 MG: 500 TABLET ORAL at 07:32

## 2017-03-13 RX ADMIN — ONDANSETRON 4 MG: 2 INJECTION INTRAMUSCULAR; INTRAVENOUS at 17:46

## 2017-03-13 RX ADMIN — HYDROMORPHONE HYDROCHLORIDE 0.5 MG: 1 INJECTION, SOLUTION INTRAMUSCULAR; INTRAVENOUS; SUBCUTANEOUS at 14:14

## 2017-03-13 RX ADMIN — FLUTICASONE PROPIONATE 1 SPRAY: 50 SPRAY, METERED NASAL at 21:35

## 2017-03-13 RX ADMIN — SODIUM CHLORIDE, POTASSIUM CHLORIDE, SODIUM LACTATE AND CALCIUM CHLORIDE: 600; 310; 30; 20 INJECTION, SOLUTION INTRAVENOUS at 13:35

## 2017-03-13 RX ADMIN — HYDROCODONE BITARTRATE AND ACETAMINOPHEN 1 TABLET: 5; 325 TABLET ORAL at 17:03

## 2017-03-13 RX ADMIN — PHENYLEPHRINE HYDROCHLORIDE 200 MCG: 10 INJECTION INTRAVENOUS at 10:57

## 2017-03-13 RX ADMIN — MORPHINE SULFATE 2 MG: 2 INJECTION, SOLUTION INTRAMUSCULAR; INTRAVENOUS at 19:24

## 2017-03-13 RX ADMIN — SODIUM CHLORIDE, POTASSIUM CHLORIDE, SODIUM LACTATE AND CALCIUM CHLORIDE 9 ML/HR: 600; 310; 30; 20 INJECTION, SOLUTION INTRAVENOUS at 07:32

## 2017-03-13 NOTE — ANESTHESIA POSTPROCEDURE EVALUATION
Patient: Amador Mendoza    Procedure Summary     Date Anesthesia Start Anesthesia Stop Room / Location    03/13/17 1012 1350 [facility] / [facility]       Procedure Diagnosis Surgeon Provider    C4 5 and C5 6 anterior cervical discectomy with anterior instrumentation. (Right Spine Cervical) Cervical radiculopathy  (Cervical radiculopathy [M54.12]) MD Gian Hutton MD          Anesthesia Type: general  Last vitals  /75 (03/13/17 1500)    Temp 36.4 °C (97.5 °F) (03/13/17 1500)    Pulse 94 (03/13/17 1500)   Resp 20 (03/13/17 1500)    SpO2 95 % (03/13/17 1500)      Post Anesthesia Care and Evaluation    Patient location during evaluation: PACU  Patient participation: complete - patient participated  Level of consciousness: obtunded/minimal responses  Pain management: adequate  Airway patency: patent  Anesthetic complications: No anesthetic complications    Cardiovascular status: acceptable  Respiratory status: acceptable  Hydration status: acceptable

## 2017-03-13 NOTE — ANESTHESIA PREPROCEDURE EVALUATION
Anesthesia Evaluation     history of anesthetic complications (PDPH):     Airway   Mallampati: III  possible difficult intubation  Dental      Pulmonary - normal exam   (+) sleep apnea (Bipap),   (-) COPD, asthma, not a smoker  Cardiovascular - normal exam  Exercise tolerance: good (4-7 METS)    (-) hypertension, past MI, CAD, dysrhythmias, angina, BOWLES      Neuro/Psych  GI/Hepatic/Renal/Endo    (+) obesity,    (-) renal disease, diabetes, hypothyroidism    Musculoskeletal     Abdominal    Substance History      OB/GYN          Other                                    Anesthesia Plan    ASA 3     general     Anesthetic plan and risks discussed with patient.

## 2017-03-13 NOTE — PLAN OF CARE
Problem: Patient Care Overview (Adult)  Goal: Adult Individualization and Mutuality    03/13/17 1005   Individualization   Patient Specific Preferences call pt Dami

## 2017-03-13 NOTE — OP NOTE
C4/5 and C5/6 CERVICAL DISCECTOMY ANTERIOR FUSION WITH INSTRUMENTATION  Procedure Note    Amador Mendoza  3/13/2017  [id number]    SURGEON  Lion Wood MD    ASSISTANT:  Sis Harp CFA  INDICATION:  Left arm pain, DDD and disc herniation    Pre-op Diagnosis:   Cervical radiculopathy [M54.12]    Post-op Diagnosis:     Post-Op Diagnosis Codes:     * Cervical radiculopathy [M54.12]    PROCEDURE PERFORMED:  Procedure(s):  C4 5 and C5 6 anterior cervical discectomy with anterior instrumentation.  1. C4/5 and C5/6 anterior cervical discectomy and arthrodesis  2. [place] of autograft bone.  3. Placement of interbody prosthetic device (C4/5 8mm and C5/6 7mm anatomical PEEK.  4. Anterior instrumentation (45mm Atlantis Elite plate- 15mm screws into C4, C5, and C6)  5. Microdissection technique with the use of the operating microscope    Anesthesia: General    Staff:   Circulator: Jie Brown RN; Nash Cano RN  Scrub Person: Sis Rodríguez  Vendor Representative: Leno Hassan  Assistant: Sis Harp CSA    Estimated Blood Loss: 100cc      Drains:    none       Findings: DDD and left disc herniation    Complications: incidental durotomy - repaired primarily    Details:    EMG: The patient was brought to the operating room where general endotracheal anesthesia was induced. EMG electrodes were then placed into the trapezius, deltoid, biceps, triceps, and hand bilaterally. Continuous EMG monitoring was accomplished throughout the operation. Any changes that occurred in EMG were noted immediately by the operating surgeon and simple correction in technique silence the abnormal electrical activity.    Positioning/approach: Patient was placed onto the operating table in the supine position. Care was taken to pad all susceptible areas. The right side of the neck was shaved, prepped, and draped in usual sterile manner. An incision was made on the right side of the neck in one of the horizontal  creases through the skin, subcutaneous tissues, platysma, and the anterior cervical fascia.  The fascia was opened along the anterior border of the sternocleidomastoid muscle and this muscle and the carotid sheath were reflected laterally while the laryngeal contents and esophagus reflected medially exposing the posterior cervical fascia which was opened exposing the longus coli musculature. The fascia of the muscles was coagulated and then opened and care was taken to embed the self-retaining retractor into the longus coli musculature.     Distraction posts were then placed one into the body of C4  and one into the body of C6 anterior annulotomy accomplished and the disc spaces were distracted at C4/5 and 5/6.  Localization/discectomy: The cervical levels chosen were identified by placing a bent spinal needle into the disc and fluoroscopy was used in order to confirm the appropriate level. The annulotomy was expanded and using a combination of straight and curved curettes and pituitary rongeurs the soft tissue/nucleus pulposis and annulus were removed exposing the posterior osteophytic/disc complex.    Operating microscope: The operating microscope was draped sterilely and brought into the field and the rest of the operation was performed under operative magnification with microdissection technique and micro-illumination with the aid of the operating microscope.    Cervical arthrodesis/discectomy/decompression: Cervical arthrodesis was then accomplished at C4/5 and 5/6 by using the high-speed drill under microscopic conditions with copious irrigation and removing the first layer of the adjacent endplates at each level. Posterior osteophytectomy was then accomplished with the high-speed drill as well as bilateral neural foraminotomies. The remaining small portion of annulus as well as the posterior longitudinal ligament were then elevated with a small pituitary rongeur and a angled curette was placed underneath the  ligament exposing the dura.  1 mm and 2 mm Kerrison rongeurs were then used in order to remove the posterior longitudinal ligament exposing the dura and to perform bilateral neural foraminotomies by gently going out into each of the neural foramen and identifying the exiting nerve root. Following neural foraminotomy and discectomy a small Cuellar ball hook was used in order to go out into the neural foramen and confirm that there was no evidence of residual narrowing.    Incidental durotomy occurred at C4/5 - this was sutured primarily with 8-0 prolene and Tisseal was added to assure closure.  Anton of autograft bone: It should be noted that a bone  was placed in line in the suction line and all drill trailings were collected to be used for autograft fusion.    Interbody prosthetic device: I then sized the C4/5 and C5/6  disc space to the above-noted width and chose the interbody prosthetic device. The device was stuffed with the collected autograft and then impacted into the disc space and slightly countersunk.    Anterior instrumentation: I then chose an Atlantis Elite plate of the above-noted length and, with the aid of fluoroscopic guidance, made sure that the size was appropriate. The above-noted screws were then placed and again confirmed with fluoroscopy to be of the appropriate length. The locking mechanism was tightened down according to 's specifications.    Closure: The wound was then copiously irrigated, hemostasis obtained. The wound was then closed and appropriate layers with 3-0 running Vicryl for the platysma, and Monocryl for a subcuticular skin closure topped off with Dermabond. The patient was then awakened from general endotracheal anesthesia, extubated and taken to recovery in stable good condition.      Lion Wood MD     Date: 3/13/2017  Time: 1:17 PM

## 2017-03-13 NOTE — PLAN OF CARE
Problem: Patient Care Overview (Adult)  Goal: Plan of Care Review  Outcome: Ongoing (interventions implemented as appropriate)    03/13/17 0704   Coping/Psychosocial Response Interventions   Plan Of Care Reviewed With patient   Patient Care Overview   Progress no change       Goal: Adult Individualization and Mutuality  Outcome: Ongoing (interventions implemented as appropriate)    03/13/17 0704   Individualization   Patient Specific Preferences call pt august   Patient Specific Goals no infection after surgery.   Patient Specific Interventions Teach good hand hygiene.       Goal: Discharge Needs Assessment  Outcome: Ongoing (interventions implemented as appropriate)    03/13/17 0704   Discharge Needs Assessment   Concerns To Be Addressed denies needs/concerns at this time   Current Health   Anticipated Changes Related to Illness none   Self-Care   Equipment Currently Used at Home none         Problem: Perioperative Period (Adult)  Goal: Signs and Symptoms of Listed Potential Problems Will be Absent or Manageable (Perioperative Period)  Outcome: Ongoing (interventions implemented as appropriate)    03/13/17 0704   Perioperative Period   Problems Assessed (Perioperative Period) pain   Problems Present (Perioperative Period) pain

## 2017-03-13 NOTE — H&P (VIEW-ONLY)
Subjective   Patient ID: Amador Mendoza is a 62 y.o. male is here today for follow-up of neck pain and left arm numbness prior to scheduled ACDF on 3-6-17. He is unaccompanied.    History of Present Illness   Patient presents for followup prior to undergoing 2 level ACDF. He reports continued neck pain with right arm numbness. His right shoulder has become more painful. He denies any new weakness, dyscoordination, or imbalance.     The following portions of the patient's history were reviewed and updated as appropriate: allergies, current medications, past family history, past medical history, past social history, past surgical history and problem list.  .    Review of Systems   Constitutional: Negative for fever.   HENT: Negative for trouble swallowing.    Gastrointestinal: Negative for abdominal pain.   Genitourinary: Negative for difficulty urinating and enuresis.   Musculoskeletal: Positive for neck pain. Negative for gait problem.   Neurological: Positive for weakness (left ) and numbness.       Objective        Results from last 7 days  Lab Units 03/01/17  0735   WBC 10*3/mm3 6.55   HEMOGLOBIN g/dL 14.3   HEMATOCRIT % 42.5   PLATELETS 10*3/mm3 234       Results from last 7 days  Lab Units 03/01/17  0735   SODIUM mmol/L 138   POTASSIUM mmol/L 4.1   CHLORIDE mmol/L 101   TOTAL CO2 mmol/L 23.3   BUN mg/dL 14   CREATININE mg/dL 0.95   CALCIUM mg/dL 9.4   GLUCOSE mg/dL 138*         Results from last 7 days  Lab Units 03/01/17  0735   SED RATE mm/hr 14       EKG: normal SR with left anterior fasicular block- pending MD review per report    Physical Exam   Constitutional: He is oriented to person, place, and time. He appears well-developed and well-nourished. He is cooperative.   obese   Eyes: No scleral icterus.   Neck: Neck supple.   Cardiovascular: Normal rate, regular rhythm and intact distal pulses.    No murmur heard.  Pulmonary/Chest: Effort normal and breath sounds normal.   Abdominal: Soft. Bowel  sounds are normal. There is no tenderness.   Musculoskeletal:        Cervical back: He exhibits decreased range of motion (mildly with rotation) and pain (mildly with rotation).   Neurological: He is alert and oriented to person, place, and time. He has normal strength. He displays no atrophy. No cranial nerve deficit or sensory deficit. He exhibits normal muscle tone. He has a normal Romberg Test. He displays a negative Romberg sign. Gait normal. Coordination and gait normal. GCS eye subscore is 4. GCS verbal subscore is 5. GCS motor subscore is 6.   Reflex Scores:       Tricep reflexes are 2+ on the right side and 2+ on the left side.       Bicep reflexes are 2+ on the right side and 2+ on the left side.       Brachioradialis reflexes are 2+ on the right side and 2+ on the left side.       Patellar reflexes are 2+ on the right side and 2+ on the left side.       Achilles reflexes are 2+ on the right side and 2+ on the left side.  Negative Acuna and clonus  Finger to nose intact   Heel to shin intact  Able to tandem walk  Able to walk on heels and toes   Skin: Skin is warm, dry and intact.   Psychiatric: He has a normal mood and affect. His speech is normal and behavior is normal. Judgment and thought content normal. Cognition and memory are normal.   Vitals reviewed.    Neurologic Exam     Mental Status   Oriented to person, place, and time.   Speech: speech is normal   Level of consciousness: alert    Motor Exam   Muscle bulk: normal  Overall muscle tone: normal    Strength   Strength 5/5 throughout.     Sensory Exam   Right arm light touch: normal  Left arm light touch: normal    Gait, Coordination, and Reflexes     Gait  Gait: normal    Coordination   Romberg: negative    Reflexes   Right brachioradialis: 2+  Left brachioradialis: 2+  Right biceps: 2+  Left biceps: 2+  Right triceps: 2+  Left triceps: 2+  Right patellar: 2+  Left patellar: 2+  Right achilles: 2+  Left achilles: 2+  Right Acuna: absent  Left  Armand: absent      Assessment/Plan   Independent Review of Radiographic Studies:    No new images    Medical Decision Making:    Patient with ongoing neck and right shoulder pain with arm numbness. He completed PAT today. His BP is elevated in our office, but was 150s/80s in PAT. His exam is noted above with no red flags. He knows to stop his meloxicam at least one week preop. RBAs of surgery were discussed in detail with Dr. Wood at previous visit. I answered questions with regard to PO expectations and activity. Patient is ready to proceed. 12 lead EKG is pending MD review, but likely LAF block is not an issue.    PLAN: C4/5 and C5/6 ACDF and RTO following    August was seen today for neck pain and numbness.    Diagnoses and all orders for this visit:    Cervical spondylosis without myelopathy    Cervical radicular pain      Return for Next scheduled follow up.

## 2017-03-13 NOTE — INTERVAL H&P NOTE
H&P reviewed. The patient was examined and there are no changes to the H&P.     He indicates nearly all the symptoms are on the left

## 2017-03-14 VITALS
HEIGHT: 76 IN | DIASTOLIC BLOOD PRESSURE: 74 MMHG | TEMPERATURE: 98 F | RESPIRATION RATE: 18 BRPM | HEART RATE: 100 BPM | OXYGEN SATURATION: 95 % | WEIGHT: 301.5 LBS | SYSTOLIC BLOOD PRESSURE: 146 MMHG | BODY MASS INDEX: 36.72 KG/M2

## 2017-03-14 LAB
BASOPHILS # BLD AUTO: 0 10*3/MM3 (ref 0–0.2)
BASOPHILS NFR BLD AUTO: 0 % (ref 0–1.5)
DEPRECATED RDW RBC AUTO: 47.7 FL (ref 37–54)
EOSINOPHIL # BLD AUTO: 0 10*3/MM3 (ref 0–0.7)
EOSINOPHIL NFR BLD AUTO: 0 % (ref 0.3–6.2)
ERYTHROCYTE [DISTWIDTH] IN BLOOD BY AUTOMATED COUNT: 13.2 % (ref 11.5–14.5)
HCT VFR BLD AUTO: 40.3 % (ref 40.4–52.2)
HGB BLD-MCNC: 13 G/DL (ref 13.7–17.6)
IMM GRANULOCYTES # BLD: 0.04 10*3/MM3 (ref 0–0.03)
IMM GRANULOCYTES NFR BLD: 0.3 % (ref 0–0.5)
LYMPHOCYTES # BLD AUTO: 1.1 10*3/MM3 (ref 0.9–4.8)
LYMPHOCYTES NFR BLD AUTO: 7.9 % (ref 19.6–45.3)
MCH RBC QN AUTO: 31.8 PG (ref 27–32.7)
MCHC RBC AUTO-ENTMCNC: 32.3 G/DL (ref 32.6–36.4)
MCV RBC AUTO: 98.5 FL (ref 79.8–96.2)
MONOCYTES # BLD AUTO: 1.11 10*3/MM3 (ref 0.2–1.2)
MONOCYTES NFR BLD AUTO: 8 % (ref 5–12)
NEUTROPHILS # BLD AUTO: 11.7 10*3/MM3 (ref 1.9–8.1)
NEUTROPHILS NFR BLD AUTO: 83.8 % (ref 42.7–76)
PLATELET # BLD AUTO: 244 10*3/MM3 (ref 140–500)
PMV BLD AUTO: 9.9 FL (ref 6–12)
RBC # BLD AUTO: 4.09 10*6/MM3 (ref 4.6–6)
WBC NRBC COR # BLD: 13.95 10*3/MM3 (ref 4.5–10.7)

## 2017-03-14 PROCEDURE — 85025 COMPLETE CBC W/AUTO DIFF WBC: CPT | Performed by: NEUROLOGICAL SURGERY

## 2017-03-14 PROCEDURE — G8979 MOBILITY GOAL STATUS: HCPCS

## 2017-03-14 PROCEDURE — G8980 MOBILITY D/C STATUS: HCPCS

## 2017-03-14 PROCEDURE — 25010000002 MORPHINE PER 10 MG: Performed by: NURSE PRACTITIONER

## 2017-03-14 PROCEDURE — G8978 MOBILITY CURRENT STATUS: HCPCS

## 2017-03-14 PROCEDURE — 25810000003 SODIUM CHLORIDE 0.9 % WITH KCL 20 MEQ 20-0.9 MEQ/L-% SOLUTION: Performed by: NEUROLOGICAL SURGERY

## 2017-03-14 PROCEDURE — 97161 PT EVAL LOW COMPLEX 20 MIN: CPT

## 2017-03-14 RX ORDER — CYCLOBENZAPRINE HCL 10 MG
10 TABLET ORAL 2 TIMES DAILY PRN
Qty: 50 TABLET | Refills: 0 | Status: SHIPPED | OUTPATIENT
Start: 2017-03-14 | End: 2017-06-01

## 2017-03-14 RX ORDER — HYDROCODONE BITARTRATE AND ACETAMINOPHEN 5; 325 MG/1; MG/1
2 TABLET ORAL EVERY 6 HOURS PRN
Qty: 48 TABLET | Refills: 0 | Status: SHIPPED | OUTPATIENT
Start: 2017-03-14 | End: 2017-03-23

## 2017-03-14 RX ORDER — PSEUDOEPHEDRINE HCL 30 MG
100 TABLET ORAL DAILY
Qty: 60 CAPSULE | Refills: 0 | Status: SHIPPED | OUTPATIENT
Start: 2017-03-14 | End: 2017-06-01

## 2017-03-14 RX ADMIN — DOCUSATE SODIUM 100 MG: 100 CAPSULE, LIQUID FILLED ORAL at 08:16

## 2017-03-14 RX ADMIN — POTASSIUM CHLORIDE AND SODIUM CHLORIDE 60 ML/HR: 900; 150 INJECTION, SOLUTION INTRAVENOUS at 06:15

## 2017-03-14 RX ADMIN — GABAPENTIN 100 MG: 100 CAPSULE ORAL at 08:16

## 2017-03-14 RX ADMIN — HYDROCODONE BITARTRATE AND ACETAMINOPHEN 2 TABLET: 5; 325 TABLET ORAL at 13:12

## 2017-03-14 RX ADMIN — MORPHINE SULFATE 2 MG: 2 INJECTION, SOLUTION INTRAMUSCULAR; INTRAVENOUS at 00:30

## 2017-03-14 RX ADMIN — CETIRIZINE HYDROCHLORIDE 10 MG: 10 TABLET, FILM COATED ORAL at 08:16

## 2017-03-14 RX ADMIN — HYDROCODONE BITARTRATE AND ACETAMINOPHEN 2 TABLET: 5; 325 TABLET ORAL at 08:17

## 2017-03-14 RX ADMIN — FAMOTIDINE 20 MG: 20 TABLET, FILM COATED ORAL at 08:16

## 2017-03-14 NOTE — THERAPY DISCHARGE NOTE
Acute Care - Physical Therapy Initial Eval/Discharge  [facility]     Patient Name: Amador Mendoza  : 1955  MRN: [id number]  Today's Date: 3/14/2017   Onset of Illness/Injury or Date of Surgery Date: 17     Referring Physician: Nina      Admit Date: 3/13/2017    Visit Dx:    ICD-10-CM ICD-9-CM   1. Cervical radiculopathy M54.12 723.4     Patient Active Problem List   Diagnosis   • Allergic rhinitis due to pollen   • Left arm numbness   • Cervical spondylosis without myelopathy   • Numbness in left leg   • Cervical radicular pain   • Cervical radiculopathy     Past Medical History   Diagnosis Date   • Bone spur      of his cervical spine and top of right great toe   • Cervical disc disorder      NUMBNESS/ TINGLING DOWN LEFT ARM, PAIN BETWEEN SHOULDER   • Seasonal allergies    • Sleep apnea      WEAR BIPAP   • Spinal headache      AFTER MYELOGRAM   • TMJ (dislocation of temporomandibular joint)      using mouth gaurd     Past Surgical History   Procedure Laterality Date   • Colonoscopy  2016   • Cataract extraction, bilateral     • Inguinal hernia repair     • Anterior cervical discectomy  2017   • Anterior cervical discectomy w/ fusion Right 3/13/2017     Procedure: C4 5 and C5 6 anterior cervical discectomy with anterior instrumentation.;  Surgeon: Lion Wood MD;  Location: [facility] OR;  Service:           PT ASSESSMENT (last 72 hours)      PT Evaluation       17 1120 17 0717    Rehab Evaluation    Document Type evaluation;discharge summary  -EE     Subjective Information agree to therapy  -EE     Patient Effort, Rehab Treatment excellent  -EE     Symptoms Noted During/After Treatment none  -EE     General Information    Onset of Illness/Injury or Date of Surgery Date 17  -EE     Referring Physician Nina  -EE     General Observations Adult male, supine in bed in no acute distress. Soft collar applied.  -EE     Pertinent History Of Current Problem s/p C4/5  C5/6 ACDF  -EE     Precautions/Limitations spinal precautions;other (see comments)   soft collar  -EE     Prior Level of Function independent:;all household mobility;community mobility  -EE     Equipment Currently Used at Home none  -EE none  -MP    Plans/Goals Discussed With patient;agreed upon  -EE     Barriers to Rehab none identified  -EE     Living Environment    Lives With  spouse  -MP    Living Arrangements  house  -MP    Home Accessibility stairs to enter home  -EE no concerns  -MP    Number of Stairs to Enter Home 5  -EE     Stair Railings at Home outside, present at both sides  -EE outside, present at both sides  -MP    Type of Financial/Environmental Concern  none  -MP    Transportation Available  car  -MP    Clinical Impression    Patient/Family Goals Statement Go home  -EE     Criteria for Skilled Therapeutic Interventions Met no problems identified which require skilled intervention  -EE     Pain Assessment    Pain Assessment 0-10  -EE     Pain Score 5  -EE     Pain Type Surgical pain  -EE     Pain Location Neck  -EE     Pain Intervention(s) Repositioned;Ambulation/increased activity  -EE     Response to Interventions tolerated  -EE     Cognitive Assessment/Intervention    Current Cognitive/Communication Assessment functional  -EE     Orientation Status oriented x 4  -EE     Follows Commands/Answers Questions 100% of the time  -EE     Personal Safety WNL/WFL  -EE     Personal Safety Interventions fall prevention program maintained;gait belt;nonskid shoes/slippers when out of bed;supervised activity  -EE     ROM (Range of Motion)    General ROM no range of motion deficits identified  -EE     General ROM Detail B UEs/LEs grossly WFL  -EE     MMT (Manual Muscle Testing)    General MMT Assessment no strength deficits identified  -EE     General MMT Assessment Detail B LEs grossly 4+/5  -EE     Bed Mobility, Assessment/Treatment    Bed Mob, Supine to Sit, [place] not tested  -EE     Bed Mob, Sit to  Supine, [place] not tested  -EE     Bed Mobility, Comment up in chair  -EE     Transfer Assessment/Treatment    Transfers, Sit-Stand [place] supervision required  -EE     Transfers, Stand-Sit [place] supervision required  -EE     Gait Assessment/Treatment    Gait, [place] Level supervision required  -EE     Gait, Distance (Feet) 400  -EE     Gait, Comment no gait impairments or LOB noted  -EE     Stairs Assessment/Treatment    Number of Stairs 12  -EE     Stairs, Handrail Location both sides  -EE     Stairs, [place] Level supervision required  -EE     Stairs, Comment no safety concerns  -EE     Motor Skills/Interventions    Additional Documentation Balance Skills Training (Group)  -EE     Balance Skills Training    Sitting-Level of Assistance Independent  -EE     Standing-Level of Assistance Independent  -EE     Gait Balance-Level of Assistance Distant supervision  -EE     Sensory Assessment/Intervention    Sensory Impairment --   denies numbness/tingling  -EE     Positioning and Restraints    Pre-Treatment Position sitting in chair/recliner  -EE     Post Treatment Position chair  -EE     In Chair reclined;encouraged to call for assist;call light within reach;with family/caregiver;notified nsg;with brace   soft collar applied  -EE       03/13/17 0704       General Information    Equipment Currently Used at Home none  -MP       User Key  (r) = Recorded By, (t) = Taken By, (c) = Cosigned By    Initials Name Provider Type    EE Alicia Hicks, PT Physical Therapist    MP Orquidea Hutchison, RN Registered Nurse          Physical Therapy Education     Title: PT OT SLP Therapies (Resolved)     Topic: Physical Therapy (Resolved)     Point: Mobility training (Resolved)    Learning Progress Summary    Learner Readiness Method Response Comment Documented by Status   Patient Suman VILLASENOR TATI MONACO   03/14/17 1139 Done               Point: Precautions (Resolved)    Learning Progress Summary    Learner Readiness  Method Response Comment Documented by Status   Patient TTAI Guajardo  EE 03/14/17 1139 Done                      User Key     Initials Effective Dates Name Provider Type Discipline    EE 12/01/15 -  Alicia Hicks PT Physical Therapist PT                PT Recommendation and Plan  Anticipated Discharge Disposition: home with assist  PT Frequency: evaluation only  Plan of Care Review  Plan Of Care Reviewed With: patient  Outcome Summary/Follow up Plan: Pt s/p cervical discectomy and fusion presents with mild post op pain. Pt demonstrates no significant deficits in strength, balance, or gait; ambulating independently in hallway w/o AD. Pt also able to navigate a flight of stairs w/o difficulty. Pt planning to DC home w/assist; no problems anticipated. Pt demonstrates understanding of precautions and use of soft collar. No further skilled PT warranted at this time.               Outcome Measures       03/14/17 1100          How much help from another person do you currently need...    Turning from your back to your side while in flat bed without using bedrails? 4  -EE      Moving from lying on back to sitting on the side of a flat bed without bedrails? 4  -EE      Moving to and from a bed to a chair (including a wheelchair)? 4  -EE      Standing up from a chair using your arms (e.g., wheelchair, bedside chair)? 4  -EE      Climbing 3-5 steps with a railing? 4  -EE      To walk in hospital room? 4  -EE      AM-PAC 6 Clicks Score 24  -EE      Functional Assessment    Outcome Measure Options AM-PAC 6 Clicks Basic Mobility (PT)  -EE        User Key  (r) = Recorded By, (t) = Taken By, (c) = Cosigned By    Initials Name Provider Type    EE Alicia Hicks PT Physical Therapist           Time Calculation:         PT Charges       03/14/17 1141          Time Calculation    Start Time 1120  -EE      Stop Time 1129  -EE      Time Calculation (min) 9 min  -EE      PT Received On 03/14/17  -EE        User Key  (r) = Recorded By, (t) =  Taken By, (c) = Cosigned By    Initials Name Provider Type    EE Alicia Hicks PT Physical Therapist          Therapy Charges for Today     Code Description Service Date Service Provider Modifiers Qty    46426835392 HC PT MOBILITY CURRENT 3/14/2017 Alicia Hicks, PT GP, [place] 1    36244045696 HC PT MOBILITY PROJECTED 3/14/2017 Alicia Hicks PT GP,  1    55837949952 HC PT MOBILITY DISCHARGE 3/14/2017 Alicia Hicks PT GP,  1    44095805259 HC PT EVAL LOW COMPLEXITY 1 3/14/2017 Alicia Hicks PT GP 1          PT G-Codes  Outcome Measure Options: AM-PAC 6 Clicks Basic Mobility (PT)  Functional Limitation: Mobility: Walking and moving around  Mobility: Walking and Moving Around Current Status (): 0 percent impaired, limited or restricted  Mobility: Walking and Moving Around Goal Status (): 0 percent impaired, limited or restricted  Mobility: Walking and Moving Around Discharge Status (): 0 percent impaired, limited or restricted    PT Discharge Summary  Anticipated Discharge Disposition: home with assist  Reason for Discharge: Independent    Alicia Hicks PT  3/14/2017

## 2017-03-14 NOTE — DISCHARGE SUMMARY
"Amador Mendoza  1955    Patient Care Team:  Javier Trujillo MD as PCP - General (Family Medicine)  Mk Rodríguez MD as Consulting Physician (Neurology)    Date of Admit: 3/13/2017    Date of Discharge:  3/14/2017    Discharge Diagnosis:Active Problems:    Cervical radiculopathy      Procedures Performed  Procedure(s):  C4 5 and C5 6 anterior cervical discectomy with anterior instrumentation.       Consultants:   Consults     No orders found for last 30 day(s).          Condition on Discharge: stable    Discharge disposition: home    Brief HPI: / Hospital Course: He was admitted yesterday, March 13, 2017, to undergo the above-noted procedure with Dr. Wood.  Preoperatively, he had a left arm numbness with tingling, intermittent pain as well as left shoulder discomfort.  Postop day 1, he reports that left arm numbness and tingling and the \"going asleep feeling\" has completely resolved following surgery.  He does have the ongoing left shoulder discomfort, but this is also improved.  No problems with bowel or bladder, he has been up walking around without any difficulty, anterior cervical incision site is clean dry and intact and pain is well-controlled with oral narcotics.  He'll be discharged home today in stable condition, normal vital signs and normal postop lab work.  He will be discharged with hydrocodone 5 mg #48, Flexeril 10 mg to be taken twice daily PRN #50, stool softeners and he is to resume discharge meds as directed.  He is several with previously scheduled postoperative appointment.  He does have some throat irritation but is swallowing without difficulty.        Results from last 7 days  Lab Units 03/14/17  0516   WBC 10*3/mm3 13.95*   HEMOGLOBIN g/dL 13.0*   HEMATOCRIT % 40.3*   PLATELETS 10*3/mm3 244         Discharge Physical Exam:    General Appearance No acute distress   Neck No adenopathy, supple, trachea midline, no thyromegaly, no carotid bruit, no JVD, anterior cervical incision site is " clean dry and intact, normal surrounding skin    Lungs Clear to auscultation,respirations regular, even and unlabored   Heart Regular rhythm and normal rate, normal S1 and S2, no murmur, no gallop, no rub, no click   Chest wall No abnormalities observed   Abdomen Normal bowel sounds, no masses, no hepatomegaly, soft   Extremities Moves all extremities well, no edema, no cyanosis, no redness   Neurological Alert and oriented x 3     Discharge Medications   [facility]   Home Medication Instructions BUD:[id number]    Printed on:03/14/17 9771   Medication Information                      ACETAMINOPHEN EXTRA STRENGTH PO  Take 500 mg by mouth As Needed. Take one at bedtime              cetirizine (ZyrTEC) 10 MG tablet  Take 10 mg by mouth daily.             cyclobenzaprine (FLEXERIL) 10 MG tablet  Take 1 tablet by mouth 2 (Two) Times a Day As Needed for muscle spasms.             docusate sodium 100 MG capsule  Take 100 mg by mouth Daily.             fluticasone (FLONASE) 50 MCG/ACT nasal spray  1 spray into each nostril Daily.             gabapentin (NEURONTIN) 100 MG capsule  Take 100 mg by mouth 3 (Three) Times a Day.             HYDROcodone-acetaminophen (NORCO) 5-325 MG per tablet  Take 2 tablets by mouth Every 6 (Six) Hours As Needed for Moderate Pain (4-6) or severe pain (7-10) for up to 9 days.                 Discharge Diet:  as tolerated    Activity at DC: no lifting greater than 10 pounds, no driving while taking narcotic medications, and can shower, soft collar for comfort only.    Call for: Patient instructed to call for fever >100.5, chills, wound swelling/drainage/redness, change in neurologic status including but not limited to worsening pr-eop symtpoms        Follow-up Appointments  Future Appointments  Date Time Provider Department Center   3/27/2017 2:00 PM NICOLE Bob [facility] None        NICOLE Valentin  03/14/17  12:30 PM    30 min spent in reviewing records, discussion  and examination of the patient and discussion with other members of the patient's medical team.

## 2017-03-14 NOTE — PLAN OF CARE
Problem: Patient Care Overview (Adult)  Goal: Plan of Care Review  Outcome: Ongoing (interventions implemented as appropriate)    03/14/17 0556   Coping/Psychosocial Response Interventions   Plan Of Care Reviewed With patient   Patient Care Overview   Progress improving   Outcome Evaluation   Outcome Summary/Follow up Plan Pt still with some pain; received PO and IV pain med x1 last night; Up with minimal assist to the bathroom; No complaints of H/A ; Incision C/D/I; Soft collar in place; Pt wore home CPAP last night         Problem: Pain, Acute (Adult)  Goal: Acceptable Pain Control/Comfort Level  Outcome: Ongoing (interventions implemented as appropriate)    Problem: Fall Risk (Adult)  Goal: Absence of Falls  Outcome: Ongoing (interventions implemented as appropriate)

## 2017-03-14 NOTE — PLAN OF CARE
Problem: Pain, Acute (Adult)  Goal: Identify Related Risk Factors and Signs and Symptoms  Outcome: Ongoing (interventions implemented as appropriate)  Goal: Acceptable Pain Control/Comfort Level  Outcome: Ongoing (interventions implemented as appropriate)    Problem: Fall Risk (Adult)  Goal: Identify Related Risk Factors and Signs and Symptoms  Outcome: Ongoing (interventions implemented as appropriate)    03/13/17 2026   Fall Risk   Fall Risk: Related Risk Factors other (see comments)  (surgery)   Fall Risk: Signs and Symptoms presence of risk factors       Goal: Absence of Falls  Outcome: Ongoing (interventions implemented as appropriate)

## 2017-03-14 NOTE — PLAN OF CARE
Problem: Patient Care Overview (Adult)  Goal: Plan of Care Review    03/14/17 1139   Coping/Psychosocial Response Interventions   Plan Of Care Reviewed With patient   Outcome Evaluation   Outcome Summary/Follow up Plan Pt s/p cervical discectomy and fusion presents with mild post op pain. Pt demonstrates no significant deficits in strength, balance, or gait; ambulating independently in hallway w/o AD. Pt also able to navigate a flight of stairs w/o difficulty. Pt planning to DC home w/assist; no problems anticipated. Pt demonstrates understanding of precautions and use of soft collar. No further skilled PT warranted at this time.

## 2017-03-27 ENCOUNTER — OFFICE VISIT (OUTPATIENT)
Dept: NEUROSURGERY | Facility: CLINIC | Age: 62
End: 2017-03-27

## 2017-03-27 ENCOUNTER — HOSPITAL ENCOUNTER (OUTPATIENT)
Dept: GENERAL RADIOLOGY | Facility: HOSPITAL | Age: 62
Discharge: HOME | End: 2017-03-27
Attending: NEUROLOGICAL SURGERY | Admitting: NEUROLOGICAL SURGERY

## 2017-03-27 VITALS
BODY MASS INDEX: 36.65 KG/M2 | WEIGHT: 301 LBS | HEIGHT: 76 IN | DIASTOLIC BLOOD PRESSURE: 108 MMHG | SYSTOLIC BLOOD PRESSURE: 160 MMHG | HEART RATE: 80 BPM

## 2017-03-27 DIAGNOSIS — M54.12 CERVICAL RADICULOPATHY: ICD-10-CM

## 2017-03-27 DIAGNOSIS — Z09 POSTOP CHECK: Primary | ICD-10-CM

## 2017-03-27 PROCEDURE — 72040 X-RAY EXAM NECK SPINE 2-3 VW: CPT

## 2017-03-27 PROCEDURE — 99024 POSTOP FOLLOW-UP VISIT: CPT | Performed by: NURSE PRACTITIONER

## 2017-03-27 NOTE — PROGRESS NOTES
" HPI:   Amador Mendoza is a 62 y.o. male for follow-up of cervical spondylosis with radiculopathy. He is status post C4/5, C5/6 ACDF on March 13, 2017. He was discharged to home. He has not had postoperative complications.  He reports complete resolution of arm pain.  He has some soreness in his neck at the end of the day, but no specific pain.  He is off all pain medications and gabapentin.  He takes Tylenol when necessary.  He is using his bone growth stimulator 4 hours daily.  He is anxious to return to work.    He presents unaccompanied.     Review of Systems   Constitutional: Negative for chills and fever.   Gastrointestinal: Negative for constipation.   Genitourinary: Negative for difficulty urinating and enuresis.   Musculoskeletal: Negative for neck pain.   Skin: Negative for wound (Denies wound redness, swelling, or drainage ).   Neurological: Negative for weakness, numbness and headaches.   Psychiatric/Behavioral: Negative for sleep disturbance.        BP (!) 160/108 (BP Location: Left arm, Patient Position: Sitting, Cuff Size: Adult)  Pulse 80  Ht 76\" (193 cm)  Wt (!) 301 lb (137 kg)  BMI 36.64 kg/m2    Physical Exam   Constitutional: He is oriented to person, place, and time. He appears well-developed and well-nourished.   obese   Neck: Decreased range of motion (mildly with rotation) present. No tracheal deviation present.   Nearly healed right anterior cervical incision.  There is a soft hematoma underlying the incision.   Cardiovascular: Intact distal pulses.    Pulmonary/Chest: Effort normal.   Neurological: He is alert and oriented to person, place, and time. He has a normal Tandem Gait Test. Gait normal.   Skin: Skin is warm and dry.   Psychiatric: He has a normal mood and affect. His behavior is normal.   Vitals reviewed.    Neurologic Exam     Mental Status   Oriented to person, place, and time.   Level of consciousness: alert    Motor Exam     Strength   Right deltoid: 5/5  Left deltoid: " 5/5  Right biceps: 5/5  Left biceps: 5/5  Right triceps: 5/5  Left triceps: 5/5  Right wrist flexion: 5/5  Left wrist flexion: 5/5  Right wrist extension: 5/5  Left wrist extension: 5/5  Right interossei: 5/5  Left interossei: 5/5    Sensory Exam   Right arm light touch: normal  Left arm light touch: normal    Gait, Coordination, and Reflexes     Gait  Gait: normal    Coordination   Tandem walking coordination: normal    Reflexes   Right [place]: absent  Left [place]: absent      Findings/Results:  Cervical x-rays from [facility] dated March 27, 2017 reveal postoperative changes at C4/5 and C5/6 with instrumentation in good position and no complicating features.    Assessment/Plan:  August was seen today for post-op.    Diagnoses and all orders for this visit:    Postop check  -     XR Spine Cervical Complete With Flex Ext; Future      Discussion/Summary  Patient doing well now 2 weeks status post 2 level ACDF.  He did not bring his bone growth stimulator for compliance check, but states that he is using it 4 hours daily as directed.  He has had complete resolution of his presenting left arm pain.  He has some neck soreness.    His exam is as noted above with no neurologic red flags.  His wound is healing well.  There is small hematoma underlying the incision.  His blood pressure continues to be elevated we spoke about this again today in the importance of monitoring and checking in with his PCP regarding this.  He states again that it is always related to seeing a doctor.  His imaging reveals postoperative changes as above.    He is doing quite well 2 weeks after surgery.  He may return to work.  I have given him both verbal and written postoperative instructions today.  Told him to take it easy.  We will see him back in 2 months with cervical x-rays including flexion-extension.    Plan: Return in about 2 months (around 5/27/2017) for Follow-up with NP, with imaging.         Patient Care  Team    Patient Care Team:  Javier Trujillo MD as PCP - General (Family Medicine)  Mk Rodríguez MD as Consulting Physician (Neurology)    Sully Aguirre, APRN  3/27/2017

## 2017-06-01 ENCOUNTER — OFFICE VISIT (OUTPATIENT)
Dept: NEUROSURGERY | Facility: CLINIC | Age: 62
End: 2017-06-01

## 2017-06-01 ENCOUNTER — HOSPITAL ENCOUNTER (OUTPATIENT)
Dept: GENERAL RADIOLOGY | Facility: HOSPITAL | Age: 62
Discharge: HOME | End: 2017-06-01
Admitting: NURSE PRACTITIONER

## 2017-06-01 VITALS
DIASTOLIC BLOOD PRESSURE: 82 MMHG | HEART RATE: 84 BPM | BODY MASS INDEX: 37.14 KG/M2 | SYSTOLIC BLOOD PRESSURE: 162 MMHG | WEIGHT: 305 LBS | RESPIRATION RATE: 24 BRPM | HEIGHT: 76 IN

## 2017-06-01 DIAGNOSIS — R20.0 NUMBNESS IN LEFT LEG: ICD-10-CM

## 2017-06-01 DIAGNOSIS — Z98.890 POST-OPERATIVE STATE: Primary | ICD-10-CM

## 2017-06-01 DIAGNOSIS — Z09 POSTOP CHECK: ICD-10-CM

## 2017-06-01 PROCEDURE — 72052 X-RAY EXAM NECK SPINE 6/>VWS: CPT

## 2017-06-01 PROCEDURE — 99024 POSTOP FOLLOW-UP VISIT: CPT | Performed by: NURSE PRACTITIONER

## 2017-06-01 NOTE — PROGRESS NOTES
"Subjective   Patient ID: Amador Mendoza is a 62 y.o. male is here today for post op 3 follow-up ACDF. Patient present un-accompanied.    History of Present Illness    He returns to the office today for 3 month postop visit status post C4 5 and C5 6 ACDF on March 13, 2017.  He is undergone repeat cervical imaging prior to today's appointment.    Today, he denies any ongoing left arm numbness tingling or weakness.  He also denies any pain in the left arm left shoulder and posterior neck.  He does have some residual numbness in the left lower extremity, particularly in the left anterior thigh and left lateral shin however, this is 50% improved postop.  He can walk now without any pain or weakness and overall is feeling much better.  He continues to wear the bone growth stimulator daily.  His right anterior cervical incision site is well-healed.  He is very happy with his postoperative results.    He presents unaccompanied.    /82 (BP Location: Right arm, Patient Position: Sitting)  Pulse 84  Resp 24  Ht 76\" (193 cm)  Wt (!) 305 lb (138 kg)  BMI 37.13 kg/m2    The following portions of the patient's history were reviewed and updated as appropriate: allergies, current medications, past family history, past medical history, past social history, past surgical history and problem list.    Review of Systems   HENT: Negative for trouble swallowing.    Eyes: Negative for visual disturbance.   Cardiovascular: Negative for leg swelling.   Musculoskeletal: Negative for back pain, gait problem, myalgias, neck pain and neck stiffness.   Skin: Negative for rash.   Neurological: Negative for speech difficulty, weakness and numbness.       Objective   Physical Exam   Constitutional: He is oriented to person, place, and time. He appears well-developed and well-nourished. He is cooperative.   HENT:   Head: Normocephalic.   Eyes: EOM are normal.   Neck: Normal range of motion. Neck supple. No tracheal deviation present. " "  Pulmonary/Chest: Effort normal and breath sounds normal.   Abdominal: Soft.   Musculoskeletal: Normal range of motion. He exhibits no tenderness.   Strength equal and normal bilateral upper and lower extremities  Well-healed anterior cervical incision site   Neurological: He is alert and oriented to person, place, and time. He has normal strength. He displays normal reflexes. No cranial nerve deficit. Coordination and gait normal. GCS eye subscore is 4. GCS verbal subscore is 5. GCS motor subscore is 6.   Stable gait and station, able to heel and toe walk, able to tandem  Normal motor and sensory exam bilateral upper extremities   Skin: Skin is warm and dry.   Psychiatric: He has a normal mood and affect. His behavior is normal. Judgment and thought content normal.   Vitals reviewed.    Neurologic Exam     Mental Status   Oriented to person, place, and time.     Cranial Nerves     CN III, IV, VI   Extraocular motions are normal.     Motor Exam     Strength   Strength 5/5 throughout.       Assessment/Plan   Independent Review of Radiographic Studies:    Cervical 5 view x-ray imaging with flexion and extension reveals normal postop changes, intact surgical hardware and no sign of instability with flexion and extension.    Medical Decision Making:    He returns the office today for 3 month postoperative visit status post 2 level ACDF in March with Dr. Wood.  Exam as noted above, no red flags.  He is happy to report complete resolution of posterior neck and left upper arm pain.  He also has no numbness, tingling or weakness in the left upper extremity.  He does have minimal residual paresthesias in the left lower extremity as documented above.  He is able to walk and do almost all activities without any pain.  If he \"overdoes it\", he will take a couple of Tylenol and this will relieve any discomfort.  He is to wear the bone growth stimulator until the battery dies.  He has no restrictions going forward from our " standpoint.    We will have him return to the office in 9 months for 1 year postop follow-up with repeat cervical imaging.  He is to call our office at anytime with any questions or concerns.    Plan: Return to office in 9 months cervical imaging is ordered    August was seen today for acdf.    Diagnoses and all orders for this visit:    Post-operative state  -     XR Spine Cervical 2 View; Future    Numbness in left leg      Return in about 9 months (around 3/1/2018).

## 2018-08-06 ENCOUNTER — OFFICE VISIT (OUTPATIENT)
Dept: FAMILY MEDICINE CLINIC | Facility: CLINIC | Age: 63
End: 2018-08-06

## 2018-08-06 VITALS
HEIGHT: 76 IN | BODY MASS INDEX: 37.75 KG/M2 | SYSTOLIC BLOOD PRESSURE: 169 MMHG | WEIGHT: 310 LBS | HEART RATE: 88 BPM | RESPIRATION RATE: 16 BRPM | TEMPERATURE: 97.8 F | DIASTOLIC BLOOD PRESSURE: 70 MMHG

## 2018-08-06 DIAGNOSIS — N49.2 SCROTAL NODULE: Primary | ICD-10-CM

## 2018-08-06 DIAGNOSIS — Z12.5 SCREENING FOR PROSTATE CANCER: ICD-10-CM

## 2018-08-06 DIAGNOSIS — Z00.00 ANNUAL PHYSICAL EXAM: ICD-10-CM

## 2018-08-06 PROCEDURE — 99214 OFFICE O/P EST MOD 30 MIN: CPT | Performed by: FAMILY MEDICINE

## 2018-08-06 NOTE — PROGRESS NOTES
"Subjective   August ELYSSA Mendoza is a 63 y.o. male.     CC: Scrotal Nodule    History of Present Illness     Pt comes in today c/o a roughly 4 week h/o right scrotal nodule that is mildly tender. Denies other sx. Reports getting more firm over time.    Pt meticulously monitors his BPs at home and averages 130's/70's.    The following portions of the patient's history were reviewed and updated as appropriate: allergies, current medications, past family history, past medical history, past social history, past surgical history and problem list.    Review of Systems   Constitutional: Negative for activity change, chills, fatigue and fever.   Respiratory: Negative for cough and shortness of breath.    Cardiovascular: Negative for chest pain and palpitations.   Gastrointestinal: Negative for abdominal pain.   Endocrine: Negative for cold intolerance.   Genitourinary:        Scrotal nodule   Psychiatric/Behavioral: Negative for behavioral problems and dysphoric mood. The patient is not nervous/anxious.      /70   Pulse 88   Temp 97.8 °F (36.6 °C) (Oral)   Resp 16   Ht 193 cm (76\")   Wt (!) 141 kg (310 lb)   BMI 37.73 kg/m²     Objective   Physical Exam   Constitutional: He appears well-developed and well-nourished.   Neck: Neck supple. No thyromegaly present.   Cardiovascular: Normal rate and regular rhythm.    No murmur heard.  Pulmonary/Chest: Effort normal and breath sounds normal.   Abdominal: Bowel sounds are normal. There is no tenderness.   Genitourinary:   Genitourinary Comments: Firm tenderness of the right epididymus   Psychiatric: He has a normal mood and affect. His behavior is normal.   Nursing note and vitals reviewed.      Assessment/Plan   August was seen today for nodule right scrotum.    Diagnoses and all orders for this visit:    Scrotal nodule  -     US scrotum and testicles; Future  -     Ambulatory Referral to Urology    Screening for prostate cancer  -     Comprehensive metabolic panel  -  "    Lipid panel  -     CBC and Differential  -     TSH    Annual physical exam  Comments:  for labs only, don't bill  Orders:  -     PSA

## 2018-08-07 LAB
ALBUMIN SERPL-MCNC: 4.9 G/DL (ref 3.5–5.2)
ALBUMIN/GLOB SERPL: 1.9 G/DL
ALP SERPL-CCNC: 76 U/L (ref 39–117)
ALT SERPL-CCNC: 55 U/L (ref 1–41)
AST SERPL-CCNC: 37 U/L (ref 1–40)
BASOPHILS # BLD AUTO: 0.05 10*3/MM3 (ref 0–0.2)
BASOPHILS NFR BLD AUTO: 0.9 % (ref 0–1.5)
BILIRUB SERPL-MCNC: 0.4 MG/DL (ref 0.1–1.2)
BUN SERPL-MCNC: 13 MG/DL (ref 8–23)
BUN/CREAT SERPL: 13.5 (ref 7–25)
CALCIUM SERPL-MCNC: 9.4 MG/DL (ref 8.6–10.5)
CHLORIDE SERPL-SCNC: 98 MMOL/L (ref 98–107)
CHOLEST SERPL-MCNC: 265 MG/DL (ref 0–200)
CO2 SERPL-SCNC: 27.1 MMOL/L (ref 22–29)
CREAT SERPL-MCNC: 0.96 MG/DL (ref 0.76–1.27)
EOSINOPHIL # BLD AUTO: 0.1 10*3/MM3 (ref 0–0.7)
EOSINOPHIL NFR BLD AUTO: 1.8 % (ref 0.3–6.2)
ERYTHROCYTE [DISTWIDTH] IN BLOOD BY AUTOMATED COUNT: 13.1 % (ref 11.5–14.5)
GLOBULIN SER CALC-MCNC: 2.6 GM/DL
GLUCOSE SERPL-MCNC: 156 MG/DL (ref 65–99)
HCT VFR BLD AUTO: 46.1 % (ref 40.4–52.2)
HDLC SERPL-MCNC: 37 MG/DL (ref 40–60)
HGB BLD-MCNC: 14.8 G/DL (ref 13.7–17.6)
IMM GRANULOCYTES # BLD: 0.02 10*3/MM3 (ref 0–0.03)
IMM GRANULOCYTES NFR BLD: 0.4 % (ref 0–0.5)
LDLC SERPL CALC-MCNC: 161 MG/DL (ref 0–100)
LYMPHOCYTES # BLD AUTO: 1.89 10*3/MM3 (ref 0.9–4.8)
LYMPHOCYTES NFR BLD AUTO: 33.1 % (ref 19.6–45.3)
MCH RBC QN AUTO: 32.2 PG (ref 27–32.7)
MCHC RBC AUTO-ENTMCNC: 32.1 G/DL (ref 32.6–36.4)
MCV RBC AUTO: 100.4 FL (ref 79.8–96.2)
MONOCYTES # BLD AUTO: 0.5 10*3/MM3 (ref 0.2–1.2)
MONOCYTES NFR BLD AUTO: 8.8 % (ref 5–12)
NEUTROPHILS # BLD AUTO: 3.17 10*3/MM3 (ref 1.9–8.1)
NEUTROPHILS NFR BLD AUTO: 55.4 % (ref 42.7–76)
PLATELET # BLD AUTO: 218 10*3/MM3 (ref 140–500)
POTASSIUM SERPL-SCNC: 4.3 MMOL/L (ref 3.5–5.2)
PROT SERPL-MCNC: 7.5 G/DL (ref 6–8.5)
PSA SERPL-MCNC: 0.71 NG/ML (ref 0–4)
RBC # BLD AUTO: 4.59 10*6/MM3 (ref 4.6–6)
SODIUM SERPL-SCNC: 140 MMOL/L (ref 136–145)
TRIGL SERPL-MCNC: 334 MG/DL (ref 0–150)
TSH SERPL DL<=0.005 MIU/L-ACNC: 2.15 MIU/ML (ref 0.27–4.2)
VLDLC SERPL CALC-MCNC: 66.8 MG/DL (ref 5–40)
WBC # BLD AUTO: 5.71 10*3/MM3 (ref 4.5–10.7)

## 2018-08-08 LAB
HBA1C MFR BLD: 6.6 % (ref 4.8–5.6)
Lab: NORMAL
WRITTEN AUTHORIZATION: NORMAL

## 2018-08-09 ENCOUNTER — HOSPITAL ENCOUNTER (OUTPATIENT)
Dept: ULTRASOUND IMAGING | Facility: HOSPITAL | Age: 63
Discharge: HOME | End: 2018-08-09
Admitting: FAMILY MEDICINE

## 2018-08-09 DIAGNOSIS — N49.2 SCROTAL NODULE: ICD-10-CM

## 2018-08-09 PROCEDURE — 76870 US EXAM SCROTUM: CPT

## 2018-10-02 NOTE — PERIOPERATIVE NURSING NOTE
Collar-procare [id number] 21 inches, 6 inches chin to chest, med long   Please send Suprep to pharmacy on file.

## 2018-12-24 ENCOUNTER — OFFICE VISIT (OUTPATIENT)
Dept: FAMILY MEDICINE CLINIC | Facility: CLINIC | Age: 63
End: 2018-12-24

## 2018-12-24 VITALS
RESPIRATION RATE: 16 BRPM | WEIGHT: 305 LBS | BODY MASS INDEX: 37.14 KG/M2 | DIASTOLIC BLOOD PRESSURE: 92 MMHG | SYSTOLIC BLOOD PRESSURE: 169 MMHG | HEART RATE: 86 BPM | HEIGHT: 76 IN | OXYGEN SATURATION: 98 % | TEMPERATURE: 97.8 F

## 2018-12-24 DIAGNOSIS — J01.00 ACUTE NON-RECURRENT MAXILLARY SINUSITIS: Primary | ICD-10-CM

## 2018-12-24 PROBLEM — Z98.890 POST-OPERATIVE STATE: Status: RESOLVED | Noted: 2017-06-01 | Resolved: 2018-12-24

## 2018-12-24 PROCEDURE — 99213 OFFICE O/P EST LOW 20 MIN: CPT | Performed by: FAMILY MEDICINE

## 2018-12-24 RX ORDER — AMOXICILLIN AND CLAVULANATE POTASSIUM 875; 125 MG/1; MG/1
1 TABLET, FILM COATED ORAL EVERY 12 HOURS SCHEDULED
Qty: 20 TABLET | Refills: 0 | Status: SHIPPED | OUTPATIENT
Start: 2018-12-24 | End: 2019-06-18

## 2018-12-24 NOTE — PROGRESS NOTES
"Subjective   Amador Mendoza is a 63 y.o. male.     CC: URI    History of Present Illness     Amador Mendoza 63 y.o. male who presents for evaluation of sinus pressure and congestion, cough. Symptoms include congestion and productive cough.  Onset of symptoms was 10 days ago, gradually worsening since that time. Patient denies hemoptysis.   Evaluation to date: none Treatment to date:  Mucinex.       The following portions of the patient's history were reviewed and updated as appropriate: allergies, current medications, past family history, past medical history, past social history, past surgical history and problem list.    Review of Systems   Constitutional: Negative for activity change, chills, fatigue and fever.   HENT: Positive for congestion, postnasal drip, rhinorrhea and sinus pressure. Negative for ear pain and sore throat.    Respiratory: Positive for cough. Negative for chest tightness, shortness of breath and wheezing.    Cardiovascular: Negative for chest pain and palpitations.   Gastrointestinal: Negative for abdominal pain and nausea.   Endocrine: Negative for cold intolerance and polydipsia.   Musculoskeletal: Negative for myalgias.   Skin: Negative for rash.   Neurological: Negative for headaches.   Psychiatric/Behavioral: Negative for behavioral problems and dysphoric mood.   All other systems reviewed and are negative.    /92   Pulse 86   Temp 97.8 °F (36.6 °C) (Oral)   Resp 16   Ht 193 cm (76\")   Wt (!) 138 kg (305 lb)   SpO2 98%   BMI 37.13 kg/m²     Objective   Physical Exam   Constitutional: He appears well-developed and well-nourished.   HENT:   Right Ear: Tympanic membrane normal.   Left Ear: Tympanic membrane normal.   Nose: Right sinus exhibits maxillary sinus tenderness. Left sinus exhibits maxillary sinus tenderness.   Mouth/Throat: Oropharynx is clear and moist. No oropharyngeal exudate, posterior oropharyngeal erythema or tonsillar abscesses.   Neck: Neck supple. No " thyromegaly present.   Cardiovascular: Normal rate and regular rhythm.   No murmur heard.  Pulmonary/Chest: Effort normal and breath sounds normal.   Abdominal: Bowel sounds are normal.   Psychiatric: He has a normal mood and affect. His behavior is normal.   Nursing note and vitals reviewed.      Assessment/Plan   August was seen today for nasal congestion, sinusitis, uri and cough.    Diagnoses and all orders for this visit:    Acute non-recurrent maxillary sinusitis  -     amoxicillin-clavulanate (AUGMENTIN) 875-125 MG per tablet; Take 1 tablet by mouth Every 12 (Twelve) Hours.

## 2019-05-29 DIAGNOSIS — L91.8 SKIN TAG: Primary | ICD-10-CM

## 2019-06-18 ENCOUNTER — OFFICE VISIT (OUTPATIENT)
Dept: RETAIL CLINIC | Facility: CLINIC | Age: 64
End: 2019-06-18

## 2019-06-18 VITALS
HEART RATE: 102 BPM | SYSTOLIC BLOOD PRESSURE: 134 MMHG | RESPIRATION RATE: 18 BRPM | OXYGEN SATURATION: 98 % | TEMPERATURE: 97.4 F | DIASTOLIC BLOOD PRESSURE: 80 MMHG

## 2019-06-18 DIAGNOSIS — K52.9 GASTROENTERITIS: Primary | ICD-10-CM

## 2019-06-18 PROCEDURE — 99213 OFFICE O/P EST LOW 20 MIN: CPT | Performed by: NURSE PRACTITIONER

## 2019-06-18 RX ORDER — ONDANSETRON 4 MG/1
4 TABLET, FILM COATED ORAL EVERY 6 HOURS PRN
Qty: 12 TABLET | Refills: 0 | Status: SHIPPED | OUTPATIENT
Start: 2019-06-18 | End: 2020-12-22

## 2019-06-18 RX ORDER — IBUPROFEN 200 MG
200 TABLET ORAL EVERY 6 HOURS PRN
COMMUNITY

## 2019-06-18 NOTE — PROGRESS NOTES
Subjective   Amador Mendoza is a 64 y.o. male.     Diarrhea    This is a new problem. The current episode started in the past 7 days (3 days ago). Episode frequency: hourly until taking the immodium which then happened every 4 hours. The problem has been gradually worsening. Pertinent negatives include no fever, headaches or vomiting. Associated symptoms comments: Nausea started today, stomach cramping, bloated feeling    . Treatments tried: immodium which helps but diarrhea has come returned now since last night.        The following portions of the patient's history were reviewed and updated as appropriate: allergies, current medications, past family history, past medical history, past social history, past surgical history and problem list.    Review of Systems   Constitutional: Negative for fever.   Gastrointestinal: Positive for diarrhea. Negative for vomiting.       Objective   Physical Exam   Constitutional: He is cooperative. No distress.   HENT:   Head: Normocephalic.   Right Ear: Hearing, tympanic membrane, external ear and ear canal normal.   Left Ear: Hearing, tympanic membrane, external ear and ear canal normal.   Nose: Nose normal.   Mouth/Throat: Oropharynx is clear and moist.   Eyes: Conjunctivae, EOM and lids are normal. Pupils are equal, round, and reactive to light.   Neck: Trachea normal and full passive range of motion without pain.   Cardiovascular: Normal rate, regular rhythm and normal pulses.   Pulmonary/Chest: Effort normal and breath sounds normal.   Abdominal: Bowel sounds are normal. He exhibits distension (mild distention with some firmness, no tenderness).   Neurological: He is alert.   Skin: Skin is warm. Capillary refill takes less than 2 seconds. Turgor is normal.   Psychiatric: He has a normal mood and affect. His speech is normal and behavior is normal.   Vitals reviewed.        Assessment/Plan   August was seen today for diarrhea.    Diagnoses and all orders for this  visit:    Gastroenteritis    Other orders  -     ondansetron (ZOFRAN) 4 MG tablet; Take 1 tablet by mouth Every 6 (Six) Hours As Needed for Nausea or Vomiting.

## 2019-06-18 NOTE — PATIENT INSTRUCTIONS
Viral Gastroenteritis, Adult  Viral gastroenteritis is also known as the stomach flu. This condition is caused by various viruses. These viruses can be passed from person to person very easily (are very contagious). This condition may affect your stomach, small intestine, and large intestine. It can cause sudden watery diarrhea, fever, and vomiting.  Diarrhea and vomiting can make you feel weak and cause you to become dehydrated. You may not be able to keep fluids down. Dehydration can make you tired and thirsty, cause you to have a dry mouth, and decrease how often you urinate. Older adults and people with other diseases or a weak immune system are at higher risk for dehydration.  It is important to replace the fluids that you lose from diarrhea and vomiting. If you become severely dehydrated, you may need to get fluids through an IV tube.  What are the causes?  Gastroenteritis is caused by various viruses, including rotavirus and norovirus. Norovirus is the most common cause in adults.  You can get sick by eating food, drinking water, or touching a surface contaminated with one of these viruses. You can also get sick from sharing utensils or other personal items with an infected person.  What increases the risk?  This condition is more likely to develop in people:  · Who have a weak defense system (immune system).  · Who live with one or more children who are younger than 2 years old.  · Who live in a nursing home.  · Who go on cruise ships.    What are the signs or symptoms?  Symptoms of this condition start suddenly 1-2 days after exposure to a virus. Symptoms may last a few days or as long as a week. The most common symptoms are watery diarrhea and vomiting. Other symptoms include:  · Fever.  · Headache.  · Fatigue.  · Pain in the abdomen.  · Chills.  · Weakness.  · Nausea.  · Muscle aches.  · Loss of appetite.    How is this diagnosed?  This condition is diagnosed with a medical history and physical exam. You  may also have a stool test to check for viruses or other infections.  How is this treated?  This condition typically goes away on its own. The focus of treatment is to restore lost fluids (rehydration). Your health care provider may recommend that you take an oral rehydration solution (ORS) to replace important salts and minerals (electrolytes) in your body. Severe cases of this condition may require giving fluids through an IV tube.  Treatment may also include medicine to help with your symptoms.  Follow these instructions at home:  Follow instructions from your health care provider about how to care for yourself at home.  Follow these recommendations as told by your health care provider:  · Take an ORS. This is a drink that is sold at pharmacies and retail stores.  · Drink clear fluids in small amounts as you are able. Clear fluids include water, ice chips, diluted fruit juice, and low-calorie sports drinks.  · Eat bland, easy-to-digest foods in small amounts as you are able. These foods include bananas, applesauce, rice, lean meats, toast, and crackers.  · Avoid fluids that contain a lot of sugar or caffeine, such as energy drinks, sports drinks, and soda.  · Avoid alcohol.  · Avoid spicy or fatty foods.    General instructions    · Drink enough fluid to keep your urine clear or pale yellow.  · Wash your hands often. If soap and water are not available, use hand .  · Make sure that all people in your household wash their hands well and often.  · Take over-the-counter and prescription medicines only as told by your health care provider.  · Rest at home while you recover.  · Watch your condition for any changes.  · Take a warm bath to relieve any burning or pain from frequent diarrhea episodes.  · Keep all follow-up visits as told by your health care provider. This is important.  Contact a health care provider if:  · You cannot keep fluids down.  · Your symptoms get worse.  · You have new symptoms.  · You  feel light-headed or dizzy.  · You have muscle cramps.  Get help right away if:  · You have chest pain.  · You feel extremely weak or you faint.  · You see blood in your vomit.  · Your vomit looks like coffee grounds.  · You have bloody or black stools or stools that look like tar.  · You have a severe headache, a stiff neck, or both.  · You have a rash.  · You have severe pain, cramping, or bloating in your abdomen.  · You have trouble breathing or you are breathing very quickly.  · Your heart is beating very quickly.  · Your skin feels cold and clammy.  · You feel confused.  · You have pain when you urinate.  · You have signs of dehydration, such as:  ? Dark urine, very little urine, or no urine.  ? Cracked lips.  ? Dry mouth.  ? Sunken eyes.  ? Sleepiness.  ? Weakness.  This information is not intended to replace advice given to you by your health care provider. Make sure you discuss any questions you have with your health care provider.  Document Released: 12/18/2006 Document Revised: 08/02/2018 Document Reviewed: 08/23/2016  [employer] Interactive Patient Education © 2019 [employer]

## 2020-12-17 NOTE — PROGRESS NOTES
Subjective   Patient ID: Amador Mendoza is a 65 y.o. male is being seen for consultation today at the request of Self Referring for NP Lshoulder/arm pain with numb L hip pain. No new imaging.    He is a previous patient seen last 3/27/17. He had a C4/C5, C5-C6 ACDF 3/13/17 by Dr. Wood.  He was lost to follow-up after his first visit.    History of Present Illness  Today, Mr. Mendoza reports 6 months of aching posterior left shoulder pain that is worst in the afternoon, particularly after working at the computer all day. If he gets up and moves around it improves. There is no radiating arm pain but there is tingling in the left forearm, palm and fingers- particularly the last 2 digits. He has chronic left  weakness that does seem somewhat worse recently. He takes ibuprofen at bedtime and sometimes during night.     He also reports aching left lateral hip pain-localized to this area. No radiating pain. It is aggravated by prolonged standing, but walking is OK. No N/T/weakness in leg. No back pain.     The following portions of the patient's history were reviewed and updated as appropriate: allergies, current medications, past family history, past medical history, past social history, past surgical history and problem list.    Review of Systems   Constitutional: Positive for activity change.   HENT: Negative.    Eyes: Negative.    Respiratory: Negative for chest tightness and shortness of breath.    Cardiovascular: Negative for chest pain.   Gastrointestinal: Negative.    Endocrine: Negative.    Genitourinary: Negative.    Musculoskeletal: Positive for back pain. Negative for neck pain and neck stiffness.   Skin: Negative.    Allergic/Immunologic: Negative.    Neurological: Positive for weakness and numbness.   Hematological: Negative.    Psychiatric/Behavioral: Positive for sleep disturbance.       Objective     Vitals:    12/22/20 1454   BP: 163/92   Pulse: 110   Temp: 97.7 °F (36.5 °C)   TempSrc: Temporal  "  Weight: (!) 138 kg (304 lb 3.8 oz)   Height: 193 cm (76\")     Body mass index is 37.03 kg/m².      Physical Exam  Vitals signs reviewed.   Constitutional:       Comments: Body mass index is 37.03 kg/m².  Large body habitus   Neck:      Comments:   Well-healed right anterior incision  Pulmonary:      Effort: Pulmonary effort is normal.   Musculoskeletal:      Left elbow: Tenderness:  Negative Tinel's at left elbow.      Left hip: He exhibits decreased range of motion and tenderness ( Greater trochanteric bursa).      Cervical back: He exhibits tenderness ( Left suprascapular) and pain ( Positive Spurling with pain at top of the left shoulder). He exhibits normal range of motion and no bony tenderness.   Neurological:      General: No focal deficit present.      Mental Status: He is alert.      Coordination: Romberg Test normal.      Gait: Gait is intact.      Deep Tendon Reflexes: Strength normal.      Reflex Scores:       Tricep reflexes are 2+ on the right side and 2+ on the left side.       Bicep reflexes are 2+ on the right side and 2+ on the left side.       Brachioradialis reflexes are 2+ on the right side and 2+ on the left side.       Patellar reflexes are 2+ on the right side and 2+ on the left side.       Achilles reflexes are 2+ on the left side.  Psychiatric:         Mood and Affect: Mood normal.         Thought Content: Thought content normal.         Judgment: Judgment normal.       Neurologic Exam     Mental Status   Attention: normal. Concentration: normal.   Level of consciousness: alert  Knowledge: good.   Normal comprehension.     Motor Exam   Muscle bulk: normal    Strength   Strength 5/5 throughout.     Sensory Exam   Right arm light touch: normal  Left arm light touch: Ring finger splitting numbness, decreased sensation left forearm.    Gait, Coordination, and Reflexes     Gait  Gait: normal    Coordination   Romberg: negative    Reflexes   Right brachioradialis: 2+  Left brachioradialis: " 2+  Right biceps: 2+  Left biceps: 2+  Right triceps: 2+  Left triceps: 2+  Right patellar: 2+  Left patellar: 2+  Left achilles: 2+  Right Acuna: absent  Left Acuna: absent  Able to heel and toe walk bilaterally           Assessment/Plan   Independent Review of Radiographic Studies:      I personally reviewed the images from the following studies.    No new imaging    Medical Decision Making:      Patient with prior two-level ACDF who failed to follow-up after his first postop visit presents today with 6 months of left scapular pain with some tingling in the left forearm.  No radicular pain down the arm.  He has chronic left hand  weakness that did not improve following surgery.  He thinks it may be slightly worse.  He also complains of left lateral hip pain with no radiation.  No associated back pain.  No significant red flags on exam.  Has some tenderness left greater trochanteric bursa as well as left suprascapular.  Some mild sensory changes in left forearm and medial hand.  Recommend beginning with conservative measures including physical therapy and medication management.  Recommend patient increase his ibuprofen and take to 3 times daily with food for 7 to 10 days to reduce inflammation at shoulder and hip.  If symptoms improve, can wean to twice daily for a few days then once daily and then as needed.  Prescribed muscle relaxant to try for suprascapular pain.  Advised to not take prior to driving.  Heat and gentle shoulder exercises.  Physical therapy.  Return office 6 weeks.  If failing to improve, consider further imaging or EMG/NCV left upper extremity.  I advised that I do not think the hip pain is related to a neurologic issue but likely an orthopedic issue.  He will obtain cervical x-rays for evaluation postop status.    Plan: Cervical x-rays, PT, trial anti-inflammatory and muscle relaxant.  Return office 6 weeks.    Diagnoses and all orders for this visit:    1. S/P cervical spinal fusion  (Primary)  -     XR Spine Cervical Complete With Flex Ext; Future    2. Chronic scapular pain  -     XR Spine Cervical Complete With Flex Ext; Future  -     Ambulatory Referral to Physical Therapy Evaluate and treat    3. Left arm numbness  -     XR Spine Cervical Complete With Flex Ext; Future  -     Ambulatory Referral to Physical Therapy Evaluate and treat    4. Greater trochanteric bursitis, left  -     Ambulatory Referral to Physical Therapy Evaluate and treat    Other orders  -     methocarbamol (ROBAXIN) 750 MG tablet; Take 1 tablet by mouth 4 (Four) Times a Day As Needed for Muscle Spasms.  Dispense: 40 tablet; Refill: 0      Return in about 6 weeks (around 2/2/2021) for with imaging, After PT.

## 2020-12-22 ENCOUNTER — OFFICE VISIT (OUTPATIENT)
Dept: NEUROSURGERY | Facility: CLINIC | Age: 65
End: 2020-12-22

## 2020-12-22 ENCOUNTER — HOSPITAL ENCOUNTER (OUTPATIENT)
Dept: GENERAL RADIOLOGY | Facility: HOSPITAL | Age: 65
Discharge: HOME | End: 2020-12-22
Admitting: NURSE PRACTITIONER

## 2020-12-22 VITALS
HEART RATE: 110 BPM | TEMPERATURE: 97.7 F | SYSTOLIC BLOOD PRESSURE: 163 MMHG | DIASTOLIC BLOOD PRESSURE: 92 MMHG | WEIGHT: 304.24 LBS | HEIGHT: 76 IN | BODY MASS INDEX: 37.05 KG/M2

## 2020-12-22 DIAGNOSIS — Z98.1 S/P CERVICAL SPINAL FUSION: ICD-10-CM

## 2020-12-22 DIAGNOSIS — G89.29 CHRONIC SCAPULAR PAIN: ICD-10-CM

## 2020-12-22 DIAGNOSIS — M89.8X1 CHRONIC SCAPULAR PAIN: ICD-10-CM

## 2020-12-22 DIAGNOSIS — M70.62 GREATER TROCHANTERIC BURSITIS, LEFT: ICD-10-CM

## 2020-12-22 DIAGNOSIS — R20.0 LEFT ARM NUMBNESS: ICD-10-CM

## 2020-12-22 DIAGNOSIS — Z98.1 S/P CERVICAL SPINAL FUSION: Primary | ICD-10-CM

## 2020-12-22 PROCEDURE — 72052 X-RAY EXAM NECK SPINE 6/>VWS: CPT

## 2020-12-22 PROCEDURE — 99214 OFFICE O/P EST MOD 30 MIN: CPT | Performed by: NURSE PRACTITIONER

## 2020-12-22 RX ORDER — METHOCARBAMOL 750 MG/1
750 TABLET, FILM COATED ORAL 4 TIMES DAILY PRN
Qty: 40 TABLET | Refills: 0 | Status: SHIPPED | OUTPATIENT
Start: 2020-12-22 | End: 2022-12-06

## 2020-12-23 ENCOUNTER — TELEPHONE (OUTPATIENT)
Dept: NEUROSURGERY | Facility: CLINIC | Age: 65
End: 2020-12-23

## 2020-12-23 NOTE — TELEPHONE ENCOUNTER
Sully Aguirre, Stephanie Lin MA             Please notify patient that I reviewed his x-rays.  Prior fusion looks good.  Mild degenerative changes above the level of fusion but nothing overtly concerning.      Patient is aware of results.

## 2021-01-12 ENCOUNTER — TREATMENT (OUTPATIENT)
Dept: PHYSICAL THERAPY | Facility: CLINIC | Age: 66
End: 2021-01-12

## 2021-01-12 DIAGNOSIS — R26.81 DIFFICULTY STANDING: ICD-10-CM

## 2021-01-12 DIAGNOSIS — R20.0 ARM NUMBNESS LEFT: ICD-10-CM

## 2021-01-12 DIAGNOSIS — M25.559 PAIN, HIP: Primary | ICD-10-CM

## 2021-01-12 DIAGNOSIS — G89.29 CHRONIC PAIN OF SCAPULA: ICD-10-CM

## 2021-01-12 DIAGNOSIS — M89.8X1 CHRONIC PAIN OF SCAPULA: ICD-10-CM

## 2021-01-12 PROCEDURE — 97110 THERAPEUTIC EXERCISES: CPT | Performed by: PHYSICAL THERAPIST

## 2021-01-12 PROCEDURE — 97530 THERAPEUTIC ACTIVITIES: CPT | Performed by: PHYSICAL THERAPIST

## 2021-01-12 PROCEDURE — 97162 PT EVAL MOD COMPLEX 30 MIN: CPT | Performed by: PHYSICAL THERAPIST

## 2021-01-12 NOTE — PROGRESS NOTES
Physical Therapy Initial Evaluation and Plan of Care    Patient: Amador Mendoza   : 1955  Diagnosis/ICD-10 Code:  Pain, hip [M25.559]  Referring practitioner: NICOLE Bob    Subjective Evaluation    History of Present Illness  Date of onset: 2020  Mechanism of injury: L scap pain/L arm numbness/L troch bursitis    I did a 3 1/2 mile hike in [place] in May and noticed lateral L hip pain last night.  I didn't bother me while I was walking.  Pt denies radicular symptoms.     I'm now having the same pain I had before surgery and it has gotten progressively worse.  After surgery my symptoms had been relieved except for a bit of arm weakness.  I'm now having L upper trap/upper scap pain with tingling into my forearm.  My monitor is still at eye level and I've been using my standing desk.  I only notice my lateral hip pain with prolonged standing.  It throbs     I had a massage in October and when she rubbed my neck it made my nauseous but improved my hip.  I was also nauseous in PT last time when Raymond would do traction.      I can only sleep about 3 hours before being woken up by hip.  Pain is located on the outside of the hip.      PMH  C4/C5, C5-C6 ACDF 3/13/17 by Dr. Wood. He has chronic left hand  weakness that did not improve following surgery.   FINDINGS: The patient has had previous anterior fusion extending from C4  to C6 with interbody grafts and anterior plate with screws and no  complicating features are seen. There is no motion at the fused levels  during flexion and extension and the appearance is unchanged from  2017. 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   Pain  Current pain ratin  At worst pain ratin  Quality: throbbing and dull ache  Alleviating factors: OTC rub.  Aggravating factors: standing, keyboarding and sleeping (laying on L side )    Hand dominance: right    Patient Goals  Patient  goals for therapy: increased motion and decreased pain         Subjective Questionnaire: QuickDASH: 23%, Oswestry 32% and LEFS 65/72  PLOF: cooking; generally sedentary  Medical Hx reviewed      Objective          Postural Observations    Additional Postural Observation Details  Rounded sh    Palpation   Left   No palpable tenderness to the gluteus medius, levator scapulae, piriformis and upper trapezius.   Tenderness of the rhomboids.     Left Shoulder Comments  Left rhomboids: along scap border.     Tenderness     Left Hip   Tenderness in the greater trochanter.     Neurological Testing     Sensation   Cervical/Thoracic   Left   Intact: light touch    Right   Intact: light touch    Active Range of Motion   Cervical/Thoracic Spine   Cervical    Flexion: 30 degrees   Extension: 25 degrees   Left lateral flexion: 30 degrees   Right lateral flexion: 30 degrees   Left rotation: 50 degrees   Right rotation: 65 degrees   Left Hip   Flexion: 104 degrees   External rotation (90/90): 35 degrees   Internal rotation (90/90): 35 degrees     Additional Active Range of Motion Details  No c/o pain with C-S ROM testing    Strength/Myotome Testing     Left Shoulder     Isolated Muscles   Lower trapezius: 5   Middle trapezius: 4   Rhomboids: 5     Left Hip   Planes of Motion   Flexion: 4  Abduction: 4 (pain)  Adduction: 4  External rotation: 5  Internal rotation: 5    Right Hip   Planes of Motion   Flexion: 5    Additional Strength Details  B UE myotomes 5/5    Tests   Cervical     Left   Positive ULTT1.     Left Hip   Positive Favio.   Negative IJEOMA.   SLR: Negative.           Assessment & Plan     Assessment  Impairments: abnormal or restricted ROM, activity intolerance, impaired physical strength, lacks appropriate home exercise program and pain with function  Assessment details: Amador Mendoza is a 66 y.o. male referred to physical therapy for L hip/scapular pain and LUE numbness. He presents with + LUE neural tension,  decreased but non painful C-S AROM, decreased segmental mobility T-S, palp tenderness along L scap border and L greater trochanter, weakness in gluts/L mid trap and decreased tolerance to sleeping and standing due to L hip pain and decreased sitting tolerance due scap pain.  Pt would benefit from skilled PT services in order to address listed impairments and increase tolerance to normal daily activities including ADLs, work and recreational activities.         Prognosis: good  Functional Limitations: sleeping, uncomfortable because of pain and standing  Goals  Plan Goals: STG In 6 visits  1. Pt to be independent with HEP  2. Pt is able to sleep at least 4 hours before awakening due to hip pain  3. Pt is able to stand for at least 1 hour without increased hip pain  4. Pt reporting decreased incidence of radicular LUE symptoms during workday    LTG In 12 visits  1. Pt to exhibit at least 40 degrees of L hip ER AROM to allow for traversing objects, putting on shoes and transitional movements as necessary for ADL's  2. Pt to report min to no pain with ADLs  3. Pt to score </= 20% on Oswestry and Quick DASH  4. Pt to exhibit 5/5 strength throughout hip musculature to allow for   5. - ULTT1 testing to demonstrate reduced neural tension    Plan  Therapy options: will be seen for skilled physical therapy services  Planned modality interventions: dry needling, electrical stimulation/Russian stimulation, cryotherapy and thermotherapy (hydrocollator packs)  Planned therapy interventions: home exercise program, therapeutic activities, manual therapy, joint mobilization, strengthening, stretching, postural training and neuromuscular re-education  Duration in visits: 12  Treatment plan discussed with: patient        Timed:  Manual Therapy:    -     mins  29631;  Therapeutic Exercise:    15     mins  05064;     Neuromuscular Dawit:    -    mins  12923;    Therapeutic Activity:     10     mins  55488;   Gait Training:      -      mins  29877;     Ultrasound:     -     mins  12559;    Iontophoresis                 -     mins 99676    Timed Treatment:   25   mins   Total Treatment:     55   mins    PT SIGNATURE: SHADY Wilson License # [id number]  DATE TREATMENT INITIATED: 1/12/2021    Initial Certification  Certification Period: 4/12/2021  I certify that the therapy services are furnished while this patient is under my care.  The services outlined above are required by this patient, and will be reviewed every 90 days.     PHYSICIAN: Sully Aguirre, APRN      DATE:     Please sign and return via fax to [phone]. Thank you, [facility].

## 2021-01-12 NOTE — PATIENT INSTRUCTIONS
Access Code: [email or web address]   URL: [email or web address]   Date: 01/12/2021   Prepared by: Wilda Toney     Exercises  Hooklying Clamshell with Resistance - 10 reps - 1 sets - 3 hold - 1x daily  Supine Figure 4 Piriformis Stretch - 4 reps - 1 sets - 20 hold - 2x daily  Side Stepping with Resistance at Thighs - 10 reps - 2 sets - 1x daily  Shoulder External Rotation and Scapular Retraction - 5 reps - 1 sets - 5 second Hold - 3x daily  Shoulder External Rotation and Scapular Retraction with Resistance - 15 reps - 1 sets - 5 hold - 1x daily  Standing Shoulder Posterior Capsule Stretch - 4 reps - 1 sets - 20 hold - 2x daily

## 2021-01-20 ENCOUNTER — TREATMENT (OUTPATIENT)
Dept: PHYSICAL THERAPY | Facility: CLINIC | Age: 66
End: 2021-01-20

## 2021-01-20 DIAGNOSIS — M25.559 PAIN, HIP: ICD-10-CM

## 2021-01-20 DIAGNOSIS — R26.81 DIFFICULTY STANDING: ICD-10-CM

## 2021-01-20 DIAGNOSIS — G89.29 CHRONIC PAIN OF SCAPULA: ICD-10-CM

## 2021-01-20 DIAGNOSIS — R20.0 ARM NUMBNESS LEFT: Primary | ICD-10-CM

## 2021-01-20 DIAGNOSIS — M89.8X1 CHRONIC PAIN OF SCAPULA: ICD-10-CM

## 2021-01-20 PROCEDURE — 97530 THERAPEUTIC ACTIVITIES: CPT | Performed by: PHYSICAL THERAPIST

## 2021-01-20 PROCEDURE — 97110 THERAPEUTIC EXERCISES: CPT | Performed by: PHYSICAL THERAPIST

## 2021-01-20 NOTE — PROGRESS NOTES
"Physical Therapy Daily Progress Note  Visit # 2      Subjective   Amador Mendoza reports:   Reports consistent hip pain since May, but \"maybe a little better.\" Does feel the scapular exercise (shoulder ER) issued at [facility] has been helpful in reducing scapular pain.         Objective   Note: pt claustrophobic, difficulty with lying supine with mask on.      See Exercise, Manual, and Modality Logs for complete treatment.     Reviewed current HEP, progressed therex program with exercises as noted.  Added serratus punches, scap retract with bow and arrow motion to HEP, written instructions issued ([place] code [id number]).      Assessment & Plan     Assessment  Assessment details:   Tolerated progression of therapeutic exercise and addition to HEP well today, no increased pain reported during or after exercises.  Did require some review and cueing for proper HEP performance.           Progress per Plan of Care and Progress strengthening /stabilization /functional activity           Timed:         Manual Therapy:         mins  23995     Therapeutic Exercise:     45    mins  78076     Neuromuscular Dawit:        mins  22180    Therapeutic Activity:      10    mins  18091     Gait Training:           mins  42510     Ultrasound:          mins  63083    Ionto                                   mins  69387  Self Care                            mins  28862    Un-Timed:  Electrical Stimulation:         mins 85613 ( )  Traction          mins 84957    Timed Treatment:   55   mins   Total Treatment:     55   mins    SARA Goodson License #[id number]  Physical Therapist Assistant  "

## 2021-01-22 ENCOUNTER — TREATMENT (OUTPATIENT)
Dept: PHYSICAL THERAPY | Facility: CLINIC | Age: 66
End: 2021-01-22

## 2021-01-22 DIAGNOSIS — R20.0 ARM NUMBNESS LEFT: Primary | ICD-10-CM

## 2021-01-22 DIAGNOSIS — G89.29 CHRONIC PAIN OF SCAPULA: ICD-10-CM

## 2021-01-22 DIAGNOSIS — M89.8X1 CHRONIC PAIN OF SCAPULA: ICD-10-CM

## 2021-01-22 DIAGNOSIS — M25.559 PAIN, HIP: ICD-10-CM

## 2021-01-22 DIAGNOSIS — R26.81 DIFFICULTY STANDING: ICD-10-CM

## 2021-01-22 PROCEDURE — 97110 THERAPEUTIC EXERCISES: CPT | Performed by: PHYSICAL THERAPIST

## 2021-01-22 PROCEDURE — 97530 THERAPEUTIC ACTIVITIES: CPT | Performed by: PHYSICAL THERAPIST

## 2021-01-22 NOTE — PROGRESS NOTES
"Physical Therapy Daily Progress Note  Visit # 3      Subjective   Amador Mendoza reports:   Reports consistent hip pain since May, but \"maybe a little better.\" Does feel the scapular exercise (shoulder ER) issued at [facility] has been helpful in reducing scapular pain.         Objective   Note: pt claustrophobic, difficulty with lying supine with mask on.      See Exercise, Manual, and Modality Logs for complete treatment.       Assessment & Plan     Assessment  Assessment details:   Tolerated addition of standing t-band resistance, rocker board, and multidirectional lunge well today, no increased pain reported during or after exercises.  Notes mild (B) CS strain at times with UE exercises but no lasting discomfort.          Progress per Plan of Care and Progress strengthening /stabilization /functional activity           Timed:         Manual Therapy:         mins  84820     Therapeutic Exercise:     44    mins  14201     Neuromuscular Dawit:        mins  70472    Therapeutic Activity:      12    mins  16166     Gait Training:           mins  25159     Ultrasound:          mins  24913    Ionto                                   mins  95021  Self Care                            mins  90181    Un-Timed:  Electrical Stimulation:         mins 50794 ( )  Traction          mins 96378    Timed Treatment:   56   mins   Total Treatment:     56   mins    SARA Goodson License #[id number]  Physical Therapist Assistant  "

## 2021-01-25 ENCOUNTER — TREATMENT (OUTPATIENT)
Dept: PHYSICAL THERAPY | Facility: CLINIC | Age: 66
End: 2021-01-25

## 2021-01-25 DIAGNOSIS — G89.29 CHRONIC PAIN OF SCAPULA: ICD-10-CM

## 2021-01-25 DIAGNOSIS — M25.559 PAIN, HIP: ICD-10-CM

## 2021-01-25 DIAGNOSIS — M89.8X1 CHRONIC PAIN OF SCAPULA: ICD-10-CM

## 2021-01-25 DIAGNOSIS — R26.81 DIFFICULTY STANDING: ICD-10-CM

## 2021-01-25 DIAGNOSIS — R20.0 ARM NUMBNESS LEFT: Primary | ICD-10-CM

## 2021-01-25 PROCEDURE — 97110 THERAPEUTIC EXERCISES: CPT | Performed by: PHYSICAL THERAPIST

## 2021-01-25 PROCEDURE — 97530 THERAPEUTIC ACTIVITIES: CPT | Performed by: PHYSICAL THERAPIST

## 2021-01-25 NOTE — PROGRESS NOTES
Physical Therapy Daily Progress Note  Visit: 4 August Reji reports: My (L) shoulder is doing better and my (L) hip is starting to show some improvement.      Subjective     Objective   See Exercise, Manual, and Modality Logs for complete treatment.       Assessment & Plan     Assessment  Assessment details: Pt was fatigued with the exercises performed in the clinic.  Worked on correct mechanics with all exercises.  Pt is improving w/ decreasing pain.     Plan  Plan details: Progress ROM / strengthening / stabilization / functional activity as tolerated          Manual Therapy:          mins  84385;  Therapeutic Exercise:     50     mins  43388;     Neuromuscular Dawit:         mins  55030;    Therapeutic Activity:      10     mins  06805;     Gait Training:            mins  74228;     Ultrasound:           mins  50895;    Electrical Stimulation:          mins  29965 ( );  Dry Needling           mins self-pay  Traction           mins 87739  Canalith Repositioning         mins 01211      Timed Treatment:   60   mins   Total Treatment:     60   mins    SHADY Thornton License #: [id number]    Physical Therapist

## 2021-02-01 ENCOUNTER — TREATMENT (OUTPATIENT)
Dept: PHYSICAL THERAPY | Facility: CLINIC | Age: 66
End: 2021-02-01

## 2021-02-01 DIAGNOSIS — R26.81 DIFFICULTY STANDING: ICD-10-CM

## 2021-02-01 DIAGNOSIS — M89.8X1 CHRONIC PAIN OF SCAPULA: ICD-10-CM

## 2021-02-01 DIAGNOSIS — R20.0 ARM NUMBNESS LEFT: Primary | ICD-10-CM

## 2021-02-01 DIAGNOSIS — G89.29 CHRONIC PAIN OF SCAPULA: ICD-10-CM

## 2021-02-01 DIAGNOSIS — M25.559 PAIN, HIP: ICD-10-CM

## 2021-02-01 PROCEDURE — 97110 THERAPEUTIC EXERCISES: CPT | Performed by: PHYSICAL THERAPIST

## 2021-02-01 PROCEDURE — 97530 THERAPEUTIC ACTIVITIES: CPT | Performed by: PHYSICAL THERAPIST

## 2021-02-01 NOTE — PROGRESS NOTES
Physical Therapy Daily Progress Note  Visit: 5 August Rjei reports: My (L) shoulder is about 80% better and my (L) hip is about 50% better.  I think that my wife and I ate something yesterday and my stomach has been upset all last night and this morning.      Subjective     Objective   See Exercise, Manual, and Modality Logs for complete treatment.       Assessment & Plan     Assessment  Assessment details: Limited Tx today because of the pt's recent upset stomach.  The pt leonel Tx well, but not all exercises performed.      Plan  Plan details: Progress ROM / strengthening / stabilization / functional activity as tolerated          Manual Therapy:          mins  58487;  Therapeutic Exercise:     30     mins  37485;     Neuromuscular Dawit:         mins  19125;    Therapeutic Activity:      10     mins  07986;     Gait Training:            mins  70158;     Ultrasound:           mins  57462;    Electrical Stimulation:          mins  33065 ( );  Dry Needling           mins self-pay  Traction           mins 74519  Canalith Repositioning         mins 92703      Timed Treatment:   40   mins   Total Treatment:     40   mins    Raymond Rodriguez PT  [place] License #: [id number]    Physical Therapist

## 2021-02-04 ENCOUNTER — TREATMENT (OUTPATIENT)
Dept: PHYSICAL THERAPY | Facility: CLINIC | Age: 66
End: 2021-02-04

## 2021-02-04 DIAGNOSIS — G89.29 CHRONIC PAIN OF SCAPULA: ICD-10-CM

## 2021-02-04 DIAGNOSIS — R20.0 ARM NUMBNESS LEFT: Primary | ICD-10-CM

## 2021-02-04 DIAGNOSIS — M25.559 PAIN, HIP: ICD-10-CM

## 2021-02-04 DIAGNOSIS — M89.8X1 CHRONIC PAIN OF SCAPULA: ICD-10-CM

## 2021-02-04 DIAGNOSIS — R26.81 DIFFICULTY STANDING: ICD-10-CM

## 2021-02-04 PROCEDURE — 97530 THERAPEUTIC ACTIVITIES: CPT | Performed by: PHYSICAL THERAPIST

## 2021-02-04 PROCEDURE — 97110 THERAPEUTIC EXERCISES: CPT | Performed by: PHYSICAL THERAPIST

## 2021-02-04 NOTE — PROGRESS NOTES
Physical Therapy Daily Progress Note  Visit: 6 August Reji reports: I am feeling good with no pain in my (L) shoulder.  The new side arcs exercise really helps.  I am still feeling pain in my (L) hip.      Subjective     Objective   See Exercise, Manual, and Modality Logs for complete treatment.       Assessment & Plan     Assessment  Assessment details: The pt demos improving exercise leonel for the (L) shdr and (L) hip but still has noticed fatigue with pain in the (L) hip with too much exercise.      Plan  Plan details: Progress ROM / strengthening / stabilization / functional activity as tolerated          Manual Therapy:      10    mins  12451;  Therapeutic Exercise:     40     mins  79372;     Neuromuscular Dawit:         mins  85057;    Therapeutic Activity:      10     mins  85626;     Gait Training:            mins  01031;     Ultrasound:           mins  66068;    Electrical Stimulation:          mins  57832 ( );  Dry Needling           mins self-pay  Traction           mins 36328  Canalith Repositioning         mins 92078      Timed Treatment:   60   mins   Total Treatment:     60   mins    Raymond Rodriguez, PT  [place] License #: [id number]    Physical Therapist

## 2021-02-08 ENCOUNTER — TREATMENT (OUTPATIENT)
Dept: PHYSICAL THERAPY | Facility: CLINIC | Age: 66
End: 2021-02-08

## 2021-02-08 DIAGNOSIS — R20.0 ARM NUMBNESS LEFT: Primary | ICD-10-CM

## 2021-02-08 DIAGNOSIS — G89.29 CHRONIC PAIN OF SCAPULA: ICD-10-CM

## 2021-02-08 DIAGNOSIS — M25.559 PAIN, HIP: ICD-10-CM

## 2021-02-08 DIAGNOSIS — R26.81 DIFFICULTY STANDING: ICD-10-CM

## 2021-02-08 DIAGNOSIS — M89.8X1 CHRONIC PAIN OF SCAPULA: ICD-10-CM

## 2021-02-08 PROCEDURE — 97110 THERAPEUTIC EXERCISES: CPT | Performed by: PHYSICAL THERAPIST

## 2021-02-08 PROCEDURE — 97530 THERAPEUTIC ACTIVITIES: CPT | Performed by: PHYSICAL THERAPIST

## 2021-02-08 PROCEDURE — 97140 MANUAL THERAPY 1/> REGIONS: CPT | Performed by: PHYSICAL THERAPIST

## 2021-02-08 NOTE — PROGRESS NOTES
"Physical Therapy Daily Progress Note  Visit # 7      Subjective   Amador Mendoza reports:  Able to stand and cook for over 45 minutes before his hip caused hip to stop and rest.  No pain in shoulder or hip this a.m.  States \"that stretch with the strap we did for my hip last time really helped.\"      Objective   See Exercise, Manual, and Modality Logs for complete treatment.       Assessment & Plan     Assessment  Assessment details:   Continues to demo improving exercise leonel for (L) hip, subjective report of improved tolerance to ADLs and kitchen activities over the weekend.            Progress per Plan of Care and Progress strengthening /stabilization /functional activity           Timed:         Manual Therapy:     14    mins  00555     Therapeutic Exercise:     32    mins  33654     Neuromuscular Dawit:        mins  21430    Therapeutic Activity:      12    mins  02905     Gait Training:           mins  37437     Ultrasound:          mins  81581    Ionto                                   mins  33696  Self Care                            mins  68777    Un-Timed:  Electrical Stimulation:         mins 97682 ( )  Traction          mins 62902    Timed Treatment:   58   mins   Total Treatment:     72   mins    SARA Goodson License #[id number]  Physical Therapist Assistant  "

## 2021-02-22 ENCOUNTER — TREATMENT (OUTPATIENT)
Dept: PHYSICAL THERAPY | Facility: CLINIC | Age: 66
End: 2021-02-22

## 2021-02-22 DIAGNOSIS — G89.29 CHRONIC PAIN OF SCAPULA: ICD-10-CM

## 2021-02-22 DIAGNOSIS — R20.0 ARM NUMBNESS LEFT: Primary | ICD-10-CM

## 2021-02-22 DIAGNOSIS — R26.81 DIFFICULTY STANDING: ICD-10-CM

## 2021-02-22 DIAGNOSIS — M25.559 PAIN, HIP: ICD-10-CM

## 2021-02-22 DIAGNOSIS — M89.8X1 CHRONIC PAIN OF SCAPULA: ICD-10-CM

## 2021-02-22 PROCEDURE — 97530 THERAPEUTIC ACTIVITIES: CPT | Performed by: PHYSICAL THERAPIST

## 2021-02-22 PROCEDURE — 97110 THERAPEUTIC EXERCISES: CPT | Performed by: PHYSICAL THERAPIST

## 2021-02-22 NOTE — PROGRESS NOTES
Physical Therapy Daily Progress Note  Visit: 8 August Reji reports: My (L) shdr is doing great with no pain.  My (L) hip still hurts especially with laying on it and when I am standing or walking without shoes.      Subjective     Objective   See Exercise, Manual, and Modality Logs for complete treatment.       Assessment & Plan     Assessment  Assessment details: The pt leonel Tx well.  Focused all care towards the (L) hip since the (L) shdr is pain free.      Plan  Plan details: Progress ROM / strengthening / stabilization / functional activity as tolerated          Manual Therapy:          mins  88467;  Therapeutic Exercise:     40     mins  44672;     Neuromuscular Dawit:    8     mins  54822;    Therapeutic Activity:      10     mins  48617;     Gait Training:            mins  77489;     Ultrasound:           mins  68232;    Electrical Stimulation:          mins  24794 ( );  Dry Needling           mins self-pay  Traction           mins 85935  Canalith Repositioning         mins 48835      Timed Treatment:   58   mins   Total Treatment:     58   mins    Raymond Rodriguez PT  [place] License #: [id number]    Physical Therapist

## 2021-02-25 ENCOUNTER — TREATMENT (OUTPATIENT)
Dept: PHYSICAL THERAPY | Facility: CLINIC | Age: 66
End: 2021-02-25

## 2021-02-25 DIAGNOSIS — M25.559 PAIN, HIP: ICD-10-CM

## 2021-02-25 DIAGNOSIS — M89.8X1 CHRONIC PAIN OF SCAPULA: ICD-10-CM

## 2021-02-25 DIAGNOSIS — R20.0 ARM NUMBNESS LEFT: Primary | ICD-10-CM

## 2021-02-25 DIAGNOSIS — R26.81 DIFFICULTY STANDING: ICD-10-CM

## 2021-02-25 DIAGNOSIS — G89.29 CHRONIC PAIN OF SCAPULA: ICD-10-CM

## 2021-02-25 PROCEDURE — 97530 THERAPEUTIC ACTIVITIES: CPT | Performed by: PHYSICAL THERAPIST

## 2021-02-25 PROCEDURE — 97110 THERAPEUTIC EXERCISES: CPT | Performed by: PHYSICAL THERAPIST

## 2021-03-22 ENCOUNTER — BULK ORDERING (OUTPATIENT)
Dept: CASE MANAGEMENT | Facility: OTHER | Age: 66
End: 2021-03-22

## 2021-03-22 DIAGNOSIS — Z23 IMMUNIZATION DUE: ICD-10-CM

## 2021-04-03 ENCOUNTER — IMMUNIZATION (OUTPATIENT)
Dept: VACCINE CLINIC | Facility: HOSPITAL | Age: 66
End: 2021-04-03

## 2021-04-03 DIAGNOSIS — Z23 IMMUNIZATION DUE: ICD-10-CM

## 2021-04-03 PROCEDURE — 91300 HC SARSCOV02 VAC 30MCG/0.3ML IM: CPT | Performed by: INTERNAL MEDICINE

## 2021-04-03 PROCEDURE — 0001A: CPT | Performed by: INTERNAL MEDICINE

## 2021-04-24 ENCOUNTER — IMMUNIZATION (OUTPATIENT)
Dept: VACCINE CLINIC | Facility: HOSPITAL | Age: 66
End: 2021-04-24

## 2021-04-24 PROCEDURE — 91300 HC SARSCOV02 VAC 30MCG/0.3ML IM: CPT | Performed by: INTERNAL MEDICINE

## 2021-04-24 PROCEDURE — 0002A: CPT | Performed by: INTERNAL MEDICINE

## 2022-12-06 ENCOUNTER — OFFICE VISIT (OUTPATIENT)
Dept: FAMILY MEDICINE CLINIC | Facility: CLINIC | Age: 67
End: 2022-12-06

## 2022-12-06 VITALS
WEIGHT: 291 LBS | SYSTOLIC BLOOD PRESSURE: 160 MMHG | TEMPERATURE: 98.3 F | HEART RATE: 98 BPM | OXYGEN SATURATION: 95 % | HEIGHT: 75 IN | BODY MASS INDEX: 36.18 KG/M2 | DIASTOLIC BLOOD PRESSURE: 78 MMHG | RESPIRATION RATE: 16 BRPM

## 2022-12-06 DIAGNOSIS — M70.62 GREATER TROCHANTERIC BURSITIS, LEFT: ICD-10-CM

## 2022-12-06 DIAGNOSIS — N30.01 ACUTE CYSTITIS WITH HEMATURIA: Primary | ICD-10-CM

## 2022-12-06 DIAGNOSIS — R03.0 ELEVATED BLOOD PRESSURE READING: ICD-10-CM

## 2022-12-06 LAB
BILIRUB BLD-MCNC: NEGATIVE MG/DL
CLARITY, POC: ABNORMAL
COLOR UR: YELLOW
EXPIRATION DATE: ABNORMAL
GLUCOSE UR STRIP-MCNC: ABNORMAL MG/DL
KETONES UR QL: NEGATIVE
LEUKOCYTE EST, POC: ABNORMAL
Lab: ABNORMAL
NITRITE UR-MCNC: POSITIVE MG/ML
PH UR: 5.5 [PH] (ref 5–8)
PROT UR STRIP-MCNC: ABNORMAL MG/DL
RBC # UR STRIP: ABNORMAL /UL
SP GR UR: 1.02 (ref 1–1.03)
UROBILINOGEN UR QL: ABNORMAL

## 2022-12-06 PROCEDURE — 99213 OFFICE O/P EST LOW 20 MIN: CPT | Performed by: NURSE PRACTITIONER

## 2022-12-06 PROCEDURE — 81003 URINALYSIS AUTO W/O SCOPE: CPT | Performed by: NURSE PRACTITIONER

## 2022-12-06 RX ORDER — CIPROFLOXACIN 500 MG/1
500 TABLET, FILM COATED ORAL EVERY 12 HOURS SCHEDULED
Qty: 20 TABLET | Refills: 0 | Status: SHIPPED | OUTPATIENT
Start: 2022-12-06

## 2022-12-06 NOTE — PROGRESS NOTES
Chief Complaint  Burning with Urination and Urinary Frequency    Subjective          Bill presents to [facility]  -year-old male complaining of ongoing.dysuria, increased urgency and frequency for the past 2-3 weeks.  He has seen at  on 11/21/202 and treated for a UTI with cefdinir.  His urine culture was positive for staph.  He did complete his prescribed antibiotics and minimal symptom improvement.  He is taking over-the-counter cranberry and increased intake.  He denies any fever or severe back pain    Answers for HPI/ROS submitted by the patient on 12/2/2022  What is the primary reason for your visit?: Painful Urination  Onset: in the past 7 days  Frequency: every urination  Progression since onset: waxing and waning  Pain quality: burning  Pain - numeric: 9/10  Fever: no fever  Sexually active?: No  History of pyelonephritis?: No  discharge: No  hesitancy: Yes  possible pregnancy: No  sweats: No    He does check his blood pressure regularly at home and has followed up with PCP with.  He states he has whitecoat syndrome he denies any headaches no blurred vision no dizziness no flushing no chest pain or pressure    He has no other c/o at todays visit    The following portions of the patient's history were reviewed and updated as appropriate: allergies, current medications, past family history, past medical history, past social history, past surgical history, and problem list    Review of Systems   Constitutional: Negative for chills and fever.   Eyes: Negative for visual disturbance.   Gastrointestinal: Negative for abdominal pain, diarrhea, nausea and vomiting.   Genitourinary: Positive for dysuria, frequency and urgency. Negative for decreased urine volume, difficulty urinating, discharge, flank pain, genital sores and hematuria.   Neurological: Negative for dizziness.        Objective   Vital Signs:   Vitals:    12/06/22 0811   BP: 160/78   Pulse: 98   Resp: 16   Temp: 98.3 °F  "(36.8 °C)   TempSrc: Oral   SpO2: 95%   Weight: 132 kg (291 lb)   Height: 190.5 cm (75\")        Class 2 Severe Obesity (BMI >=35 and <=39.9). Obesity-related health conditions include the following: hypertension. Obesity is unchanged. BMI is is above average; BMI management plan is completed. We discussed portion control and increasing exercise.        Physical Exam  Vitals reviewed.   Constitutional:       General: He is not in acute distress.  Eyes:      Conjunctiva/sclera: Conjunctivae normal.   Neck:      Thyroid: No thyromegaly.      Vascular: No carotid bruit.   Cardiovascular:      Rate and Rhythm: Normal rate and regular rhythm.      Heart sounds: Normal heart sounds.   Pulmonary:      Effort: Pulmonary effort is normal.      Breath sounds: Normal breath sounds.   Abdominal:      General: Abdomen is flat. There is no distension.      Palpations: Abdomen is soft. There is no mass.      Tenderness: There is no abdominal tenderness. There is no right CVA tenderness, left CVA tenderness, guarding or rebound.      Hernia: No hernia is present.   Neurological:      Mental Status: He is alert.   Psychiatric:         Attention and Perception: Attention normal.         Mood and Affect: Mood normal.          Result Review :     The following data was reviewed by: NICOLE Shepard on 12/06/2022:           Assessment and Plan    Diagnoses and all orders for this visit:    1. Acute cystitis with hematuria (Primary)  -     ciprofloxacin (Cipro) 500 MG tablet; Take 1 tablet by mouth Every 12 (Twelve) Hours. For infection  Dispense: 20 tablet; Refill: 0      Return if symptoms worsen or fail to improve.     Patient was given instructions and counseling regarding her condition or for health maintenance advice. Please see specific information pulled into the AVS if appropriate.  Information regarding urinary tract infection for adults was added to the patient's after visit summary today.     -Will send prescription for " Ciprp to the patient's pharmacy  -Urinalysis dipstick was performed in office today.  Her urine specimen was positive for small amount of leukocytes, small amount of blood, and positive nitrites.  -Will send urine specimen for microscopic urinalysis and urine culture.  -Take all medications as prescribed and until completed  -Drink plenty of fluids  -Monitor for temperature and take Tylenol as needed  -Seek immediate medical attention for high fever, chills, back pain, nausea and vomiting, or any other worsening signs or symptoms.  -The patient verbalized understanding of all instructions given today.      2. Greater trochanteric bursitis, left  -     Ambulatory Referral to Physical Therapy Evaluate and treat           Follow Up   Return if symptoms worsen or fail to improve.  Patient was given instructions and counseling regarding his condition or for health maintenance advice. Please see specific information pulled into the AVS if appropriate.

## 2022-12-10 LAB
BACTERIA UR CULT: ABNORMAL
BACTERIA UR CULT: ABNORMAL
OTHER ANTIBIOTIC SUSC ISLT: ABNORMAL

## 2024-06-21 ENCOUNTER — INPATIENT HOSPITAL (OUTPATIENT)
Dept: URBAN - METROPOLITAN AREA HOSPITAL 107 | Facility: HOSPITAL | Age: 69
End: 2024-06-21
Payer: MEDICARE

## 2024-06-21 DIAGNOSIS — D64.9 ANEMIA, UNSPECIFIED: ICD-10-CM

## 2024-06-21 DIAGNOSIS — F10.11 ALCOHOL ABUSE, IN REMISSION: ICD-10-CM

## 2024-06-21 DIAGNOSIS — K74.60 UNSPECIFIED CIRRHOSIS OF LIVER: ICD-10-CM

## 2024-06-21 DIAGNOSIS — D69.6 THROMBOCYTOPENIA, UNSPECIFIED: ICD-10-CM

## 2024-06-21 PROCEDURE — 99222 1ST HOSP IP/OBS MODERATE 55: CPT | Performed by: NURSE PRACTITIONER

## 2024-06-23 ENCOUNTER — INPATIENT HOSPITAL (OUTPATIENT)
Dept: URBAN - METROPOLITAN AREA HOSPITAL 107 | Facility: HOSPITAL | Age: 69
End: 2024-06-23
Payer: MEDICARE

## 2024-06-23 DIAGNOSIS — D69.6 THROMBOCYTOPENIA, UNSPECIFIED: ICD-10-CM

## 2024-06-23 DIAGNOSIS — F10.11 ALCOHOL ABUSE, IN REMISSION: ICD-10-CM

## 2024-06-23 DIAGNOSIS — K74.60 UNSPECIFIED CIRRHOSIS OF LIVER: ICD-10-CM

## 2024-06-23 DIAGNOSIS — D64.9 ANEMIA, UNSPECIFIED: ICD-10-CM

## 2024-06-23 PROCEDURE — 99233 SBSQ HOSP IP/OBS HIGH 50: CPT | Performed by: INTERNAL MEDICINE

## 2024-06-24 ENCOUNTER — INPATIENT HOSPITAL (OUTPATIENT)
Dept: URBAN - METROPOLITAN AREA HOSPITAL 107 | Facility: HOSPITAL | Age: 69
End: 2024-06-24
Payer: MEDICARE

## 2024-06-24 DIAGNOSIS — F10.11 ALCOHOL ABUSE, IN REMISSION: ICD-10-CM

## 2024-06-24 DIAGNOSIS — D64.9 ANEMIA, UNSPECIFIED: ICD-10-CM

## 2024-06-24 DIAGNOSIS — K74.60 UNSPECIFIED CIRRHOSIS OF LIVER: ICD-10-CM

## 2024-06-24 DIAGNOSIS — D69.6 THROMBOCYTOPENIA, UNSPECIFIED: ICD-10-CM

## 2024-06-24 PROCEDURE — 99233 SBSQ HOSP IP/OBS HIGH 50: CPT | Performed by: PHYSICIAN ASSISTANT

## 2024-06-25 ENCOUNTER — INPATIENT HOSPITAL (OUTPATIENT)
Dept: URBAN - METROPOLITAN AREA HOSPITAL 107 | Facility: HOSPITAL | Age: 69
End: 2024-06-25
Payer: MEDICARE

## 2024-06-25 DIAGNOSIS — Z91.199 PATIENT'S NONCOMPLIANCE WITH OTHER MEDICAL TREATMENT AND REG: ICD-10-CM

## 2024-06-25 DIAGNOSIS — K63.89 OTHER SPECIFIED DISEASES OF INTESTINE: ICD-10-CM

## 2024-06-25 DIAGNOSIS — D50.9 IRON DEFICIENCY ANEMIA, UNSPECIFIED: ICD-10-CM

## 2024-06-25 DIAGNOSIS — K22.70 BARRETT'S ESOPHAGUS WITHOUT DYSPLASIA: ICD-10-CM

## 2024-06-25 DIAGNOSIS — K74.60 UNSPECIFIED CIRRHOSIS OF LIVER: ICD-10-CM

## 2024-06-25 PROCEDURE — 45378 DIAGNOSTIC COLONOSCOPY: CPT | Performed by: INTERNAL MEDICINE

## 2024-06-25 PROCEDURE — 43239 EGD BIOPSY SINGLE/MULTIPLE: CPT | Performed by: INTERNAL MEDICINE

## 2024-07-05 ENCOUNTER — OFFICE (OUTPATIENT)
Dept: URBAN - METROPOLITAN AREA CLINIC 76 | Facility: CLINIC | Age: 69
End: 2024-07-05
Payer: OTHER GOVERNMENT

## 2024-07-05 VITALS
HEIGHT: 75 IN | OXYGEN SATURATION: 95 % | SYSTOLIC BLOOD PRESSURE: 142 MMHG | HEART RATE: 89 BPM | WEIGHT: 254 LBS | DIASTOLIC BLOOD PRESSURE: 80 MMHG | SYSTOLIC BLOOD PRESSURE: 146 MMHG | DIASTOLIC BLOOD PRESSURE: 84 MMHG

## 2024-07-05 DIAGNOSIS — D64.9 ANEMIA, UNSPECIFIED: ICD-10-CM

## 2024-07-05 DIAGNOSIS — K74.69 OTHER CIRRHOSIS OF LIVER: ICD-10-CM

## 2024-07-05 DIAGNOSIS — I48.91 UNSPECIFIED ATRIAL FIBRILLATION: ICD-10-CM

## 2024-07-05 DIAGNOSIS — B18.1 CHRONIC VIRAL HEPATITIS B WITHOUT DELTA-AGENT: ICD-10-CM

## 2024-07-05 DIAGNOSIS — I25.10 ATHEROSCLEROTIC HEART DISEASE OF NATIVE CORONARY ARTERY WITH: ICD-10-CM

## 2024-07-05 PROCEDURE — 99214 OFFICE O/P EST MOD 30 MIN: CPT | Performed by: INTERNAL MEDICINE

## (undated) DEVICE — LIMB HOLDER, WRIST/ANKLE: Brand: DEROYAL

## (undated) DEVICE — DRP C/ARM 41X74IN

## (undated) DEVICE — NDL SPINE 20G 3 1/2 YEL STRL 1P/U

## (undated) DEVICE — DISPOSABLE IRRIGATION BIPOLAR CORD, M1000 TYPE: Brand: KIRWAN

## (undated) DEVICE — GLV SURG SENSICARE MICRO PF LF 8 STRL

## (undated) DEVICE — COL BONE ANSPACH

## (undated) DEVICE — DRP SLUSH WARMR MACH 52X66IN OM-ORS-301

## (undated) DEVICE — CODMAN® SURGICAL PATTIES 3/4" X 3/4" (1.91CM X 1.91CM): Brand: CODMAN®

## (undated) DEVICE — 3.0MM PRECISION NEURO (MATCH HEAD)

## (undated) DEVICE — DRP MICROSCP LEICA W/GLASS LENS

## (undated) DEVICE — ENCORE® LATEX ORTHO SIZE 8.5, STERILE LATEX POWDER-FREE SURGICAL GLOVE: Brand: ENCORE

## (undated) DEVICE — CODMAN® SURGICAL PATTIES 1/2" X 1/2" (1.27CM X 1.27CM): Brand: CODMAN®

## (undated) DEVICE — PIN DISTRACT TI 14MM STRL

## (undated) DEVICE — DISPOSABLE 9450003 ELECTRO TWST PAIR 8CH

## (undated) DEVICE — FLOSEAL MATRIX IS INDICATED IN SURGICAL PROCEDURES (OTHER THAN IN OPHTHALMIC) AS AN ADJUNCT TO HEMOSTASIS WHEN CONTROL OF BLEEDING BY LIGATURE OR CONVENTIONAL PROCEDURES IS INEFFECTIVE OR IMPRACTICAL.: Brand: FLOSEAL HEMOSTATIC MATRIX

## (undated) DEVICE — ELECTRD BLD EDGE/INSUL1P 2.4X5.1MM STRL

## (undated) DEVICE — CONN TBG Y 5 IN 1 LF STRL

## (undated) DEVICE — MAGNETIC DRAPE: Brand: DEVON

## (undated) DEVICE — GOWN,PREVENTION PLUS,XXLARGE,STERILE: Brand: MEDLINE

## (undated) DEVICE — SUT SILK 2/0 TIES 18IN A185H

## (undated) DEVICE — ANTIBACTERIAL UNDYED BRAIDED (POLYGLACTIN 910), SYNTHETIC ABSORBABLE SUTURE: Brand: COATED VICRYL

## (undated) DEVICE — Device

## (undated) DEVICE — SUT MNCRYL PLS ANTIB UD 4/0 PS2 18IN

## (undated) DEVICE — APPL CHLORAPREP W/TINT 10.5ML PERC STRL

## (undated) DEVICE — PK NEURO SPINE 40

## (undated) DEVICE — NDL HYPO PRECISIONGLIDE REG 25G 1 1/2

## (undated) DEVICE — COLR CERV MED/DENS LNG MD

## (undated) DEVICE — COLR CERV MED/DENS NRW LNG

## (undated) DEVICE — SHEET, DRAPE, SPLIT, STERILE: Brand: MEDLINE

## (undated) DEVICE — ADHS SKIN DERMABOND TOP ADVANCED

## (undated) DEVICE — SMOKE EVACUATION TUBING WITH 7/8 IN TO 1/4 IN REDUCER: Brand: BUFFALO FILTER